# Patient Record
Sex: FEMALE | Race: BLACK OR AFRICAN AMERICAN | NOT HISPANIC OR LATINO | Employment: FULL TIME | URBAN - METROPOLITAN AREA
[De-identification: names, ages, dates, MRNs, and addresses within clinical notes are randomized per-mention and may not be internally consistent; named-entity substitution may affect disease eponyms.]

---

## 2021-08-27 ENCOUNTER — OFFICE VISIT (OUTPATIENT)
Dept: PRIMARY CARE CLINIC | Facility: CLINIC | Age: 26
End: 2021-08-27
Payer: COMMERCIAL

## 2021-08-27 ENCOUNTER — LAB VISIT (OUTPATIENT)
Dept: PRIMARY CARE CLINIC | Facility: CLINIC | Age: 26
End: 2021-08-27
Payer: COMMERCIAL

## 2021-08-27 VITALS
DIASTOLIC BLOOD PRESSURE: 84 MMHG | HEART RATE: 61 BPM | WEIGHT: 135.38 LBS | OXYGEN SATURATION: 98 % | SYSTOLIC BLOOD PRESSURE: 122 MMHG

## 2021-08-27 DIAGNOSIS — Z00.00 ANNUAL PHYSICAL EXAM: Primary | ICD-10-CM

## 2021-08-27 DIAGNOSIS — R11.2 NON-INTRACTABLE VOMITING WITH NAUSEA, UNSPECIFIED VOMITING TYPE: ICD-10-CM

## 2021-08-27 DIAGNOSIS — Z12.4 CERVICAL CANCER SCREENING: ICD-10-CM

## 2021-08-27 DIAGNOSIS — R61 HYPERHIDROSIS: ICD-10-CM

## 2021-08-27 DIAGNOSIS — Z00.00 ANNUAL PHYSICAL EXAM: ICD-10-CM

## 2021-08-27 DIAGNOSIS — F12.90 MARIJUANA USE: ICD-10-CM

## 2021-08-27 DIAGNOSIS — Z11.3 SCREEN FOR STD (SEXUALLY TRANSMITTED DISEASE): ICD-10-CM

## 2021-08-27 PROCEDURE — 1159F PR MEDICATION LIST DOCUMENTED IN MEDICAL RECORD: ICD-10-PCS | Mod: CPTII,S$GLB,, | Performed by: FAMILY MEDICINE

## 2021-08-27 PROCEDURE — 1159F MED LIST DOCD IN RCRD: CPT | Mod: CPTII,S$GLB,, | Performed by: FAMILY MEDICINE

## 2021-08-27 PROCEDURE — 87491 CHLMYD TRACH DNA AMP PROBE: CPT | Performed by: FAMILY MEDICINE

## 2021-08-27 PROCEDURE — 1126F PR PAIN SEVERITY QUANTIFIED, NO PAIN PRESENT: ICD-10-PCS | Mod: CPTII,S$GLB,, | Performed by: FAMILY MEDICINE

## 2021-08-27 PROCEDURE — 3074F SYST BP LT 130 MM HG: CPT | Mod: CPTII,S$GLB,, | Performed by: FAMILY MEDICINE

## 2021-08-27 PROCEDURE — 1160F RVW MEDS BY RX/DR IN RCRD: CPT | Mod: CPTII,S$GLB,, | Performed by: FAMILY MEDICINE

## 2021-08-27 PROCEDURE — 83036 HEMOGLOBIN GLYCOSYLATED A1C: CPT | Performed by: FAMILY MEDICINE

## 2021-08-27 PROCEDURE — 3074F PR MOST RECENT SYSTOLIC BLOOD PRESSURE < 130 MM HG: ICD-10-PCS | Mod: CPTII,S$GLB,, | Performed by: FAMILY MEDICINE

## 2021-08-27 PROCEDURE — 1126F AMNT PAIN NOTED NONE PRSNT: CPT | Mod: CPTII,S$GLB,, | Performed by: FAMILY MEDICINE

## 2021-08-27 PROCEDURE — 36415 COLL VENOUS BLD VENIPUNCTURE: CPT | Performed by: FAMILY MEDICINE

## 2021-08-27 PROCEDURE — 3079F DIAST BP 80-89 MM HG: CPT | Mod: CPTII,S$GLB,, | Performed by: FAMILY MEDICINE

## 2021-08-27 PROCEDURE — 88175 CYTOPATH C/V AUTO FLUID REDO: CPT | Performed by: FAMILY MEDICINE

## 2021-08-27 PROCEDURE — 87389 HIV-1 AG W/HIV-1&-2 AB AG IA: CPT | Performed by: FAMILY MEDICINE

## 2021-08-27 PROCEDURE — 87624 HPV HI-RISK TYP POOLED RSLT: CPT | Performed by: FAMILY MEDICINE

## 2021-08-27 PROCEDURE — 87591 N.GONORRHOEAE DNA AMP PROB: CPT | Performed by: FAMILY MEDICINE

## 2021-08-27 PROCEDURE — 80061 LIPID PANEL: CPT | Performed by: FAMILY MEDICINE

## 2021-08-27 PROCEDURE — 1160F PR REVIEW ALL MEDS BY PRESCRIBER/CLIN PHARMACIST DOCUMENTED: ICD-10-PCS | Mod: CPTII,S$GLB,, | Performed by: FAMILY MEDICINE

## 2021-08-27 PROCEDURE — 99999 PR PBB SHADOW E&M-NEW PATIENT-LVL III: ICD-10-PCS | Mod: PBBFAC,,, | Performed by: FAMILY MEDICINE

## 2021-08-27 PROCEDURE — 85025 COMPLETE CBC W/AUTO DIFF WBC: CPT | Performed by: FAMILY MEDICINE

## 2021-08-27 PROCEDURE — 86803 HEPATITIS C AB TEST: CPT | Performed by: FAMILY MEDICINE

## 2021-08-27 PROCEDURE — 36415 COLL VENOUS BLD VENIPUNCTURE: CPT | Mod: S$GLB,,, | Performed by: FAMILY MEDICINE

## 2021-08-27 PROCEDURE — 3079F PR MOST RECENT DIASTOLIC BLOOD PRESSURE 80-89 MM HG: ICD-10-PCS | Mod: CPTII,S$GLB,, | Performed by: FAMILY MEDICINE

## 2021-08-27 PROCEDURE — 99999 PR PBB SHADOW E&M-NEW PATIENT-LVL III: CPT | Mod: PBBFAC,,, | Performed by: FAMILY MEDICINE

## 2021-08-27 PROCEDURE — 80053 COMPREHEN METABOLIC PANEL: CPT | Performed by: FAMILY MEDICINE

## 2021-08-27 PROCEDURE — 36415 PR COLLECTION VENOUS BLOOD,VENIPUNCTURE: ICD-10-PCS | Mod: S$GLB,,, | Performed by: FAMILY MEDICINE

## 2021-08-27 PROCEDURE — 86592 SYPHILIS TEST NON-TREP QUAL: CPT | Performed by: FAMILY MEDICINE

## 2021-08-27 PROCEDURE — 99204 PR OFFICE/OUTPT VISIT, NEW, LEVL IV, 45-59 MIN: ICD-10-PCS | Mod: S$GLB,,, | Performed by: FAMILY MEDICINE

## 2021-08-27 PROCEDURE — 99204 OFFICE O/P NEW MOD 45 MIN: CPT | Mod: S$GLB,,, | Performed by: FAMILY MEDICINE

## 2021-08-27 RX ORDER — ONDANSETRON 4 MG/1
4 TABLET, ORALLY DISINTEGRATING ORAL EVERY 6 HOURS PRN
Qty: 30 TABLET | Refills: 1 | Status: SHIPPED | OUTPATIENT
Start: 2021-08-27 | End: 2023-03-03

## 2021-08-28 LAB
ALBUMIN SERPL BCP-MCNC: 4.1 G/DL (ref 3.5–5.2)
ALP SERPL-CCNC: 57 U/L (ref 55–135)
ALT SERPL W/O P-5'-P-CCNC: 9 U/L (ref 10–44)
ANION GAP SERPL CALC-SCNC: 7 MMOL/L (ref 8–16)
AST SERPL-CCNC: 19 U/L (ref 10–40)
BASOPHILS # BLD AUTO: 0.04 K/UL (ref 0–0.2)
BASOPHILS NFR BLD: 0.8 % (ref 0–1.9)
BILIRUB SERPL-MCNC: 0.4 MG/DL (ref 0.1–1)
BUN SERPL-MCNC: 17 MG/DL (ref 6–20)
CALCIUM SERPL-MCNC: 9.6 MG/DL (ref 8.7–10.5)
CHLORIDE SERPL-SCNC: 109 MMOL/L (ref 95–110)
CHOLEST SERPL-MCNC: 186 MG/DL (ref 120–199)
CHOLEST/HDLC SERPL: 3.4 {RATIO} (ref 2–5)
CO2 SERPL-SCNC: 25 MMOL/L (ref 23–29)
CREAT SERPL-MCNC: 1.1 MG/DL (ref 0.5–1.4)
DIFFERENTIAL METHOD: ABNORMAL
EOSINOPHIL # BLD AUTO: 0.3 K/UL (ref 0–0.5)
EOSINOPHIL NFR BLD: 5.7 % (ref 0–8)
ERYTHROCYTE [DISTWIDTH] IN BLOOD BY AUTOMATED COUNT: 13.4 % (ref 11.5–14.5)
EST. GFR  (AFRICAN AMERICAN): >60 ML/MIN/1.73 M^2
EST. GFR  (NON AFRICAN AMERICAN): >60 ML/MIN/1.73 M^2
ESTIMATED AVG GLUCOSE: 97 MG/DL (ref 68–131)
GLUCOSE SERPL-MCNC: 84 MG/DL (ref 70–110)
HBA1C MFR BLD: 5 % (ref 4–5.6)
HCT VFR BLD AUTO: 36.4 % (ref 37–48.5)
HDLC SERPL-MCNC: 55 MG/DL (ref 40–75)
HDLC SERPL: 29.6 % (ref 20–50)
HGB BLD-MCNC: 12.3 G/DL (ref 12–16)
IMM GRANULOCYTES # BLD AUTO: 0 K/UL (ref 0–0.04)
IMM GRANULOCYTES NFR BLD AUTO: 0 % (ref 0–0.5)
LDLC SERPL CALC-MCNC: 116 MG/DL (ref 63–159)
LYMPHOCYTES # BLD AUTO: 2.4 K/UL (ref 1–4.8)
LYMPHOCYTES NFR BLD: 46.5 % (ref 18–48)
MCH RBC QN AUTO: 31.3 PG (ref 27–31)
MCHC RBC AUTO-ENTMCNC: 33.8 G/DL (ref 32–36)
MCV RBC AUTO: 93 FL (ref 82–98)
MONOCYTES # BLD AUTO: 0.5 K/UL (ref 0.3–1)
MONOCYTES NFR BLD: 9.7 % (ref 4–15)
NEUTROPHILS # BLD AUTO: 1.9 K/UL (ref 1.8–7.7)
NEUTROPHILS NFR BLD: 37.3 % (ref 38–73)
NONHDLC SERPL-MCNC: 131 MG/DL
NRBC BLD-RTO: 0 /100 WBC
PLATELET # BLD AUTO: 248 K/UL (ref 150–450)
PMV BLD AUTO: 11 FL (ref 9.2–12.9)
POTASSIUM SERPL-SCNC: 4 MMOL/L (ref 3.5–5.1)
PROT SERPL-MCNC: 7.3 G/DL (ref 6–8.4)
RBC # BLD AUTO: 3.93 M/UL (ref 4–5.4)
RPR SER QL: NORMAL
SODIUM SERPL-SCNC: 141 MMOL/L (ref 136–145)
TRIGL SERPL-MCNC: 75 MG/DL (ref 30–150)
WBC # BLD AUTO: 5.07 K/UL (ref 3.9–12.7)

## 2021-08-30 LAB
C TRACH DNA SPEC QL NAA+PROBE: DETECTED
N GONORRHOEA DNA SPEC QL NAA+PROBE: NOT DETECTED

## 2021-08-31 LAB
HCV AB SERPL QL IA: NEGATIVE
HIV 1+2 AB+HIV1 P24 AG SERPL QL IA: NEGATIVE

## 2021-09-07 RX ORDER — AZITHROMYCIN 500 MG/1
1000 TABLET, FILM COATED ORAL ONCE
Qty: 2 TABLET | Refills: 0 | Status: SHIPPED | OUTPATIENT
Start: 2021-09-07 | End: 2021-09-07

## 2021-09-15 LAB
CLINICAL INFO: ABNORMAL
CYTO CVX: ABNORMAL
CYTOLOGIST CVX/VAG CYTO: ABNORMAL
CYTOLOGIST CVX/VAG CYTO: ABNORMAL
CYTOLOGY CMNT CVX/VAG CYTO-IMP: ABNORMAL
CYTOLOGY PAP THIN PREP EXPLANATION: ABNORMAL
DATE OF PREVIOUS PAP: ABNORMAL
DATE PREVIOUS BX: NO
GEN CATEG CVX/VAG CYTO-IMP: ABNORMAL
HPV I/H RISK 4 DNA CVX QL NAA+PROBE: NOT DETECTED
LMP START DATE: ABNORMAL
MICROORGANISM CVX/VAG CYTO: ABNORMAL
PATHOLOGIST CVX/VAG CYTO: ABNORMAL
SERVICE CMNT-IMP: ABNORMAL
SPECIMEN SOURCE CVX/VAG CYTO: ABNORMAL
STAT OF ADQ CVX/VAG CYTO-IMP: ABNORMAL

## 2021-11-05 ENCOUNTER — OFFICE VISIT (OUTPATIENT)
Dept: PRIMARY CARE CLINIC | Facility: CLINIC | Age: 26
End: 2021-11-05
Payer: COMMERCIAL

## 2021-11-05 VITALS
HEART RATE: 65 BPM | BODY MASS INDEX: 27.02 KG/M2 | SYSTOLIC BLOOD PRESSURE: 148 MMHG | WEIGHT: 146.81 LBS | DIASTOLIC BLOOD PRESSURE: 94 MMHG | OXYGEN SATURATION: 98 % | HEIGHT: 62 IN | TEMPERATURE: 98 F

## 2021-11-05 DIAGNOSIS — Z86.19 HISTORY OF CHLAMYDIA: Primary | ICD-10-CM

## 2021-11-05 DIAGNOSIS — L60.0 INGROWN TOENAIL OF BOTH FEET: ICD-10-CM

## 2021-11-05 PROCEDURE — 87491 CHLMYD TRACH DNA AMP PROBE: CPT | Performed by: NURSE PRACTITIONER

## 2021-11-05 PROCEDURE — 87591 N.GONORRHOEAE DNA AMP PROB: CPT | Performed by: NURSE PRACTITIONER

## 2021-11-05 PROCEDURE — 3044F HG A1C LEVEL LT 7.0%: CPT | Mod: CPTII,S$GLB,, | Performed by: NURSE PRACTITIONER

## 2021-11-05 PROCEDURE — 99999 PR PBB SHADOW E&M-EST. PATIENT-LVL III: ICD-10-PCS | Mod: PBBFAC,,, | Performed by: NURSE PRACTITIONER

## 2021-11-05 PROCEDURE — 3008F BODY MASS INDEX DOCD: CPT | Mod: CPTII,S$GLB,, | Performed by: NURSE PRACTITIONER

## 2021-11-05 PROCEDURE — 3077F PR MOST RECENT SYSTOLIC BLOOD PRESSURE >= 140 MM HG: ICD-10-PCS | Mod: CPTII,S$GLB,, | Performed by: NURSE PRACTITIONER

## 2021-11-05 PROCEDURE — 3044F PR MOST RECENT HEMOGLOBIN A1C LEVEL <7.0%: ICD-10-PCS | Mod: CPTII,S$GLB,, | Performed by: NURSE PRACTITIONER

## 2021-11-05 PROCEDURE — 3008F PR BODY MASS INDEX (BMI) DOCUMENTED: ICD-10-PCS | Mod: CPTII,S$GLB,, | Performed by: NURSE PRACTITIONER

## 2021-11-05 PROCEDURE — 99212 PR OFFICE/OUTPT VISIT, EST, LEVL II, 10-19 MIN: ICD-10-PCS | Mod: S$GLB,,, | Performed by: NURSE PRACTITIONER

## 2021-11-05 PROCEDURE — 99212 OFFICE O/P EST SF 10 MIN: CPT | Mod: S$GLB,,, | Performed by: NURSE PRACTITIONER

## 2021-11-05 PROCEDURE — 1159F MED LIST DOCD IN RCRD: CPT | Mod: CPTII,S$GLB,, | Performed by: NURSE PRACTITIONER

## 2021-11-05 PROCEDURE — 1159F PR MEDICATION LIST DOCUMENTED IN MEDICAL RECORD: ICD-10-PCS | Mod: CPTII,S$GLB,, | Performed by: NURSE PRACTITIONER

## 2021-11-05 PROCEDURE — 3077F SYST BP >= 140 MM HG: CPT | Mod: CPTII,S$GLB,, | Performed by: NURSE PRACTITIONER

## 2021-11-05 PROCEDURE — 99999 PR PBB SHADOW E&M-EST. PATIENT-LVL III: CPT | Mod: PBBFAC,,, | Performed by: NURSE PRACTITIONER

## 2021-11-05 PROCEDURE — 3080F DIAST BP >= 90 MM HG: CPT | Mod: CPTII,S$GLB,, | Performed by: NURSE PRACTITIONER

## 2021-11-05 PROCEDURE — 3080F PR MOST RECENT DIASTOLIC BLOOD PRESSURE >= 90 MM HG: ICD-10-PCS | Mod: CPTII,S$GLB,, | Performed by: NURSE PRACTITIONER

## 2021-11-10 ENCOUNTER — OFFICE VISIT (OUTPATIENT)
Dept: PODIATRY | Facility: CLINIC | Age: 26
End: 2021-11-10
Payer: COMMERCIAL

## 2021-11-10 ENCOUNTER — TELEPHONE (OUTPATIENT)
Dept: PRIMARY CARE CLINIC | Facility: CLINIC | Age: 26
End: 2021-11-10
Payer: COMMERCIAL

## 2021-11-10 VITALS
BODY MASS INDEX: 26.94 KG/M2 | RESPIRATION RATE: 18 BRPM | HEIGHT: 62 IN | HEART RATE: 65 BPM | SYSTOLIC BLOOD PRESSURE: 145 MMHG | WEIGHT: 146.38 LBS | DIASTOLIC BLOOD PRESSURE: 95 MMHG

## 2021-11-10 DIAGNOSIS — L84 CORN OR CALLUS: Primary | ICD-10-CM

## 2021-11-10 DIAGNOSIS — M21.41 PES PLANUS OF BOTH FEET: ICD-10-CM

## 2021-11-10 DIAGNOSIS — L60.0 INGROWN TOENAIL OF BOTH FEET: ICD-10-CM

## 2021-11-10 DIAGNOSIS — M21.42 PES PLANUS OF BOTH FEET: ICD-10-CM

## 2021-11-10 PROCEDURE — 99999 PR PBB SHADOW E&M-EST. PATIENT-LVL III: CPT | Mod: PBBFAC,,, | Performed by: PODIATRIST

## 2021-11-10 PROCEDURE — 3080F DIAST BP >= 90 MM HG: CPT | Mod: CPTII,S$GLB,, | Performed by: PODIATRIST

## 2021-11-10 PROCEDURE — 3044F HG A1C LEVEL LT 7.0%: CPT | Mod: CPTII,S$GLB,, | Performed by: PODIATRIST

## 2021-11-10 PROCEDURE — 99999 PR PBB SHADOW E&M-EST. PATIENT-LVL III: ICD-10-PCS | Mod: PBBFAC,,, | Performed by: PODIATRIST

## 2021-11-10 PROCEDURE — 3077F SYST BP >= 140 MM HG: CPT | Mod: CPTII,S$GLB,, | Performed by: PODIATRIST

## 2021-11-10 PROCEDURE — 1159F MED LIST DOCD IN RCRD: CPT | Mod: CPTII,S$GLB,, | Performed by: PODIATRIST

## 2021-11-10 PROCEDURE — 99203 OFFICE O/P NEW LOW 30 MIN: CPT | Mod: S$GLB,,, | Performed by: PODIATRIST

## 2021-11-10 PROCEDURE — 99203 PR OFFICE/OUTPT VISIT, NEW, LEVL III, 30-44 MIN: ICD-10-PCS | Mod: S$GLB,,, | Performed by: PODIATRIST

## 2021-11-10 PROCEDURE — 3008F PR BODY MASS INDEX (BMI) DOCUMENTED: ICD-10-PCS | Mod: CPTII,S$GLB,, | Performed by: PODIATRIST

## 2021-11-10 PROCEDURE — 3044F PR MOST RECENT HEMOGLOBIN A1C LEVEL <7.0%: ICD-10-PCS | Mod: CPTII,S$GLB,, | Performed by: PODIATRIST

## 2021-11-10 PROCEDURE — 3008F BODY MASS INDEX DOCD: CPT | Mod: CPTII,S$GLB,, | Performed by: PODIATRIST

## 2021-11-10 PROCEDURE — 3077F PR MOST RECENT SYSTOLIC BLOOD PRESSURE >= 140 MM HG: ICD-10-PCS | Mod: CPTII,S$GLB,, | Performed by: PODIATRIST

## 2021-11-10 PROCEDURE — 1159F PR MEDICATION LIST DOCUMENTED IN MEDICAL RECORD: ICD-10-PCS | Mod: CPTII,S$GLB,, | Performed by: PODIATRIST

## 2021-11-10 PROCEDURE — 3080F PR MOST RECENT DIASTOLIC BLOOD PRESSURE >= 90 MM HG: ICD-10-PCS | Mod: CPTII,S$GLB,, | Performed by: PODIATRIST

## 2021-11-15 ENCOUNTER — CLINICAL SUPPORT (OUTPATIENT)
Dept: PRIMARY CARE CLINIC | Facility: CLINIC | Age: 26
End: 2021-11-15
Payer: COMMERCIAL

## 2021-11-15 DIAGNOSIS — Z86.19 HISTORY OF CHLAMYDIA: Primary | ICD-10-CM

## 2021-11-15 DIAGNOSIS — Z11.3 SCREEN FOR STD (SEXUALLY TRANSMITTED DISEASE): ICD-10-CM

## 2021-11-15 PROCEDURE — 87491 CHLMYD TRACH DNA AMP PROBE: CPT | Performed by: NURSE PRACTITIONER

## 2021-11-15 PROCEDURE — 87591 N.GONORRHOEAE DNA AMP PROB: CPT | Performed by: NURSE PRACTITIONER

## 2021-11-19 LAB
C TRACH DNA SPEC QL NAA+PROBE: NOT DETECTED
N GONORRHOEA DNA SPEC QL NAA+PROBE: NOT DETECTED

## 2023-01-11 ENCOUNTER — PATIENT MESSAGE (OUTPATIENT)
Dept: ADMINISTRATIVE | Facility: HOSPITAL | Age: 28
End: 2023-01-11
Payer: COMMERCIAL

## 2023-01-25 ENCOUNTER — LAB VISIT (OUTPATIENT)
Dept: PRIMARY CARE CLINIC | Facility: CLINIC | Age: 28
End: 2023-01-25
Payer: COMMERCIAL

## 2023-01-25 ENCOUNTER — OFFICE VISIT (OUTPATIENT)
Dept: PRIMARY CARE CLINIC | Facility: CLINIC | Age: 28
End: 2023-01-25
Payer: COMMERCIAL

## 2023-01-25 VITALS
SYSTOLIC BLOOD PRESSURE: 173 MMHG | HEIGHT: 62 IN | WEIGHT: 155.31 LBS | BODY MASS INDEX: 28.58 KG/M2 | DIASTOLIC BLOOD PRESSURE: 98 MMHG | OXYGEN SATURATION: 99 % | HEART RATE: 81 BPM

## 2023-01-25 DIAGNOSIS — I10 HYPERTENSION, UNSPECIFIED TYPE: ICD-10-CM

## 2023-01-25 DIAGNOSIS — Z11.3 SCREENING EXAMINATION FOR STD (SEXUALLY TRANSMITTED DISEASE): Primary | ICD-10-CM

## 2023-01-25 LAB — TSH SERPL DL<=0.005 MIU/L-ACNC: 0.42 UIU/ML (ref 0.4–4)

## 2023-01-25 PROCEDURE — 84443 ASSAY THYROID STIM HORMONE: CPT | Performed by: STUDENT IN AN ORGANIZED HEALTH CARE EDUCATION/TRAINING PROGRAM

## 2023-01-25 PROCEDURE — 99999 PR PBB SHADOW E&M-EST. PATIENT-LVL III: ICD-10-PCS | Mod: PBBFAC,,, | Performed by: STUDENT IN AN ORGANIZED HEALTH CARE EDUCATION/TRAINING PROGRAM

## 2023-01-25 PROCEDURE — 3008F BODY MASS INDEX DOCD: CPT | Mod: CPTII,S$GLB,, | Performed by: STUDENT IN AN ORGANIZED HEALTH CARE EDUCATION/TRAINING PROGRAM

## 2023-01-25 PROCEDURE — 1159F MED LIST DOCD IN RCRD: CPT | Mod: CPTII,S$GLB,, | Performed by: STUDENT IN AN ORGANIZED HEALTH CARE EDUCATION/TRAINING PROGRAM

## 2023-01-25 PROCEDURE — 99999 PR PBB SHADOW E&M-EST. PATIENT-LVL III: CPT | Mod: PBBFAC,,, | Performed by: STUDENT IN AN ORGANIZED HEALTH CARE EDUCATION/TRAINING PROGRAM

## 2023-01-25 PROCEDURE — 3080F DIAST BP >= 90 MM HG: CPT | Mod: CPTII,S$GLB,, | Performed by: STUDENT IN AN ORGANIZED HEALTH CARE EDUCATION/TRAINING PROGRAM

## 2023-01-25 PROCEDURE — 87591 N.GONORRHOEAE DNA AMP PROB: CPT | Performed by: STUDENT IN AN ORGANIZED HEALTH CARE EDUCATION/TRAINING PROGRAM

## 2023-01-25 PROCEDURE — 1159F PR MEDICATION LIST DOCUMENTED IN MEDICAL RECORD: ICD-10-PCS | Mod: CPTII,S$GLB,, | Performed by: STUDENT IN AN ORGANIZED HEALTH CARE EDUCATION/TRAINING PROGRAM

## 2023-01-25 PROCEDURE — 87491 CHLMYD TRACH DNA AMP PROBE: CPT | Performed by: STUDENT IN AN ORGANIZED HEALTH CARE EDUCATION/TRAINING PROGRAM

## 2023-01-25 PROCEDURE — 3080F PR MOST RECENT DIASTOLIC BLOOD PRESSURE >= 90 MM HG: ICD-10-PCS | Mod: CPTII,S$GLB,, | Performed by: STUDENT IN AN ORGANIZED HEALTH CARE EDUCATION/TRAINING PROGRAM

## 2023-01-25 PROCEDURE — 3077F PR MOST RECENT SYSTOLIC BLOOD PRESSURE >= 140 MM HG: ICD-10-PCS | Mod: CPTII,S$GLB,, | Performed by: STUDENT IN AN ORGANIZED HEALTH CARE EDUCATION/TRAINING PROGRAM

## 2023-01-25 PROCEDURE — 3008F PR BODY MASS INDEX (BMI) DOCUMENTED: ICD-10-PCS | Mod: CPTII,S$GLB,, | Performed by: STUDENT IN AN ORGANIZED HEALTH CARE EDUCATION/TRAINING PROGRAM

## 2023-01-25 PROCEDURE — 99214 PR OFFICE/OUTPT VISIT, EST, LEVL IV, 30-39 MIN: ICD-10-PCS | Mod: S$GLB,,, | Performed by: STUDENT IN AN ORGANIZED HEALTH CARE EDUCATION/TRAINING PROGRAM

## 2023-01-25 PROCEDURE — 3077F SYST BP >= 140 MM HG: CPT | Mod: CPTII,S$GLB,, | Performed by: STUDENT IN AN ORGANIZED HEALTH CARE EDUCATION/TRAINING PROGRAM

## 2023-01-25 PROCEDURE — 99214 OFFICE O/P EST MOD 30 MIN: CPT | Mod: S$GLB,,, | Performed by: STUDENT IN AN ORGANIZED HEALTH CARE EDUCATION/TRAINING PROGRAM

## 2023-01-25 NOTE — PROGRESS NOTES
01/25/2023    Alexis Milligan  [id number]    Chief Complaint   Patient presents with    Annual Exam       HPI    This pt is new to me and presents for routine care. She has not has a check up in awhile. Sine her last check up she has been seen in the ED for back pain and dx with thoracolumbar levoscoliosis; she has never been told this before. Endorses 6-7 hr of sleep per night. Describes diet as not terrible and has been working on it. Tries to eat veggies and lean meats and fish. Tries to use non salt seasoning options. Does exercise routinely and has a ; exercising at least 4 times per week. Does not monitor BP routinely; would like to avoid medication.      Negative 10 point ROS outside of HPI    Social History     Socioeconomic History    Marital status: Single   Tobacco Use    Smoking status: Never    Smokeless tobacco: Never           Current Outpatient Medications:     ondansetron (ZOFRAN-ODT) 4 MG TbDL, Take 1 tablet (4 mg total) by mouth every 6 (six) hours as needed (nausea/vomiting)., Disp: 30 tablet, Rfl: 1      Physical Exam  Vitals:    01/25/23 0937   BP: (!) 173/98   Pulse: 81       GEN: NAD, AAox3, well nourished  HEENT: NCAT, EOMI, PEERL, no scleral injection, TM normal, moist mucous membranes, oropharynx clear, no erythema, no exudates  NECK: full rom, no cervical lymphadenopathy, no thyroidmegally  CV: RRR, no m/r/g, trace LE edema  LUNGS: CTAB, non-labored breathing, no wheezes, no crackles  ABD: soft, non-distended, no rebound/guarding, no organomegaly  EXT: n c/c/e, warm, 5/5 UE and LE strength  NEURO: CNII-XII intact no focal deficit  PSYCH: nl affect, no hallucinations, nl speech  Skin: intact, no rashes/lesions/erythema    1. Screening examination for STD (sexually transmitted disease)  - C. trachomatis/N. gonorrhoeae by AMP DNA [facility]; Urine    2. Hypertension, unspecified type  - TSH; Future  Other labs reviewed and wnl  Discussed lifestyle management at length  Rtc in 2 weeks for bp  recheck  May need to start med such as nifedipine      Windy Ordoñez MD  Family Medicine

## 2023-01-26 ENCOUNTER — PATIENT OUTREACH (OUTPATIENT)
Dept: ADMINISTRATIVE | Facility: HOSPITAL | Age: 28
End: 2023-01-26
Payer: COMMERCIAL

## 2023-01-26 ENCOUNTER — PATIENT MESSAGE (OUTPATIENT)
Dept: ADMINISTRATIVE | Facility: HOSPITAL | Age: 28
End: 2023-01-26
Payer: COMMERCIAL

## 2023-01-27 LAB
C TRACH DNA SPEC QL NAA+PROBE: NOT DETECTED
N GONORRHOEA DNA SPEC QL NAA+PROBE: NOT DETECTED

## 2023-02-27 ENCOUNTER — OFFICE VISIT (OUTPATIENT)
Dept: PRIMARY CARE CLINIC | Facility: CLINIC | Age: 28
End: 2023-02-27
Payer: COMMERCIAL

## 2023-02-27 VITALS
OXYGEN SATURATION: 100 % | SYSTOLIC BLOOD PRESSURE: 177 MMHG | HEART RATE: 91 BPM | WEIGHT: 155.56 LBS | BODY MASS INDEX: 28.63 KG/M2 | HEIGHT: 62 IN | DIASTOLIC BLOOD PRESSURE: 94 MMHG

## 2023-02-27 DIAGNOSIS — G89.29 CHRONIC RIGHT SHOULDER PAIN: Primary | ICD-10-CM

## 2023-02-27 DIAGNOSIS — Z01.419 WELL WOMAN EXAM: ICD-10-CM

## 2023-02-27 DIAGNOSIS — M25.511 CHRONIC RIGHT SHOULDER PAIN: Primary | ICD-10-CM

## 2023-02-27 DIAGNOSIS — I10 HYPERTENSION, UNSPECIFIED TYPE: ICD-10-CM

## 2023-02-27 PROCEDURE — 3080F DIAST BP >= 90 MM HG: CPT | Mod: CPTII,S$GLB,, | Performed by: STUDENT IN AN ORGANIZED HEALTH CARE EDUCATION/TRAINING PROGRAM

## 2023-02-27 PROCEDURE — 3077F PR MOST RECENT SYSTOLIC BLOOD PRESSURE >= 140 MM HG: ICD-10-PCS | Mod: CPTII,S$GLB,, | Performed by: STUDENT IN AN ORGANIZED HEALTH CARE EDUCATION/TRAINING PROGRAM

## 2023-02-27 PROCEDURE — 99999 PR PBB SHADOW E&M-EST. PATIENT-LVL III: ICD-10-PCS | Mod: PBBFAC,,, | Performed by: STUDENT IN AN ORGANIZED HEALTH CARE EDUCATION/TRAINING PROGRAM

## 2023-02-27 PROCEDURE — 3077F SYST BP >= 140 MM HG: CPT | Mod: CPTII,S$GLB,, | Performed by: STUDENT IN AN ORGANIZED HEALTH CARE EDUCATION/TRAINING PROGRAM

## 2023-02-27 PROCEDURE — 99999 PR PBB SHADOW E&M-EST. PATIENT-LVL III: CPT | Mod: PBBFAC,,, | Performed by: STUDENT IN AN ORGANIZED HEALTH CARE EDUCATION/TRAINING PROGRAM

## 2023-02-27 PROCEDURE — 1159F PR MEDICATION LIST DOCUMENTED IN MEDICAL RECORD: ICD-10-PCS | Mod: CPTII,S$GLB,, | Performed by: STUDENT IN AN ORGANIZED HEALTH CARE EDUCATION/TRAINING PROGRAM

## 2023-02-27 PROCEDURE — 3008F BODY MASS INDEX DOCD: CPT | Mod: CPTII,S$GLB,, | Performed by: STUDENT IN AN ORGANIZED HEALTH CARE EDUCATION/TRAINING PROGRAM

## 2023-02-27 PROCEDURE — 99214 OFFICE O/P EST MOD 30 MIN: CPT | Mod: S$GLB,,, | Performed by: STUDENT IN AN ORGANIZED HEALTH CARE EDUCATION/TRAINING PROGRAM

## 2023-02-27 PROCEDURE — 3008F PR BODY MASS INDEX (BMI) DOCUMENTED: ICD-10-PCS | Mod: CPTII,S$GLB,, | Performed by: STUDENT IN AN ORGANIZED HEALTH CARE EDUCATION/TRAINING PROGRAM

## 2023-02-27 PROCEDURE — 99214 PR OFFICE/OUTPT VISIT, EST, LEVL IV, 30-39 MIN: ICD-10-PCS | Mod: S$GLB,,, | Performed by: STUDENT IN AN ORGANIZED HEALTH CARE EDUCATION/TRAINING PROGRAM

## 2023-02-27 PROCEDURE — 3080F PR MOST RECENT DIASTOLIC BLOOD PRESSURE >= 90 MM HG: ICD-10-PCS | Mod: CPTII,S$GLB,, | Performed by: STUDENT IN AN ORGANIZED HEALTH CARE EDUCATION/TRAINING PROGRAM

## 2023-02-27 PROCEDURE — 1159F MED LIST DOCD IN RCRD: CPT | Mod: CPTII,S$GLB,, | Performed by: STUDENT IN AN ORGANIZED HEALTH CARE EDUCATION/TRAINING PROGRAM

## 2023-02-27 NOTE — PROGRESS NOTES
02/27/2023    Alexis Milligan  [id number]    CC: HTN    HPI    This     HTN  Trying to manage with lifestyle  Working out 4 days per week.   Diet: is focusing on carbs and protein; does not think that she is getting enough greens    Shoulder, right, chronic  Thought that she strained it several months ago but progressively hurting in the last 1-2 months even at rest  Describes as a pinch and is affecting her ADL's   No OTC meds, has tried icing  Hx of minor injuries 24/ playing lots of sport's hx of dislocation  Will feel like it's flaring      Negative 10 point ROS outside of HPI    Social History     Socioeconomic History    Marital status: Single   Tobacco Use    Smoking status: Never    Smokeless tobacco: Never           Current Outpatient Medications:     ondansetron (ZOFRAN-ODT) 4 MG TbDL, Take 1 tablet (4 mg total) by mouth every 6 (six) hours as needed (nausea/vomiting). (Patient not taking: Reported on 2/27/2023), Disp: 30 tablet, Rfl: 1      Physical Exam  Vitals:    02/27/23 0923   BP: (!) 177/94   Pulse: 91     Gen: well appearing, NAD  Resp: non labored breathing, no crackles, no wheezes, CTAB  CV: RRR no murmur, gallops, rubs, no LE edema  Shoulder:    Inspection:no obvious deformity or asymmetry   Palpation: non tender AC joint, + bicipital groove tenderness   ROM: Full ROM active and passive   Rotator cuff: + drop arm test, + empty can, + Neer +Hawkains R       1. Chronic right shoulder pain  - Ambulatory referral/consult to Physical/Occupational Therapy; Future  -continue icing, modify/stop movements that hurt such as push presses  Unable to take NSAIDS 2/2 HTN    2. Hypertension, unspecified type  RTC this week with home cuff  Pt hesistant to start meds  Hx of following with nephrology    3. Well woman exam  - Ambulatory referral/consult to Gynecology; Future  -Overdue for pap      Windy Ordoñez MD  Family Medicine

## 2023-03-03 ENCOUNTER — LAB VISIT (OUTPATIENT)
Dept: LAB | Facility: HOSPITAL | Age: 28
End: 2023-03-03
Attending: STUDENT IN AN ORGANIZED HEALTH CARE EDUCATION/TRAINING PROGRAM
Payer: COMMERCIAL

## 2023-03-03 ENCOUNTER — OFFICE VISIT (OUTPATIENT)
Dept: OBSTETRICS AND GYNECOLOGY | Facility: CLINIC | Age: 28
End: 2023-03-03
Payer: COMMERCIAL

## 2023-03-03 ENCOUNTER — CLINICAL SUPPORT (OUTPATIENT)
Dept: PRIMARY CARE CLINIC | Facility: CLINIC | Age: 28
End: 2023-03-03
Payer: COMMERCIAL

## 2023-03-03 VITALS
WEIGHT: 153.25 LBS | RESPIRATION RATE: 18 BRPM | BODY MASS INDEX: 28.2 KG/M2 | SYSTOLIC BLOOD PRESSURE: 138 MMHG | HEIGHT: 62 IN | DIASTOLIC BLOOD PRESSURE: 78 MMHG

## 2023-03-03 VITALS — DIASTOLIC BLOOD PRESSURE: 78 MMHG | SYSTOLIC BLOOD PRESSURE: 148 MMHG

## 2023-03-03 DIAGNOSIS — I10 HYPERTENSION, UNSPECIFIED TYPE: Primary | ICD-10-CM

## 2023-03-03 DIAGNOSIS — Z20.89 CONTACT WITH AND (SUSPECTED) EXPOSURE TO OTHER COMMUNICABLE DISEASES: ICD-10-CM

## 2023-03-03 DIAGNOSIS — Z01.419 WELL WOMAN EXAM WITH ROUTINE GYNECOLOGICAL EXAM: ICD-10-CM

## 2023-03-03 DIAGNOSIS — Z01.419 WELL WOMAN EXAM: ICD-10-CM

## 2023-03-03 DIAGNOSIS — Z12.4 SCREENING FOR MALIGNANT NEOPLASM OF CERVIX: Primary | ICD-10-CM

## 2023-03-03 DIAGNOSIS — Z01.30 BLOOD PRESSURE CHECK: Primary | ICD-10-CM

## 2023-03-03 LAB
HBV SURFACE AG SERPL QL IA: NORMAL
HCV AB SERPL QL IA: NORMAL
HIV 1+2 AB+HIV1 P24 AG SERPL QL IA: NORMAL

## 2023-03-03 PROCEDURE — 3078F DIAST BP <80 MM HG: CPT | Mod: CPTII,S$GLB,, | Performed by: STUDENT IN AN ORGANIZED HEALTH CARE EDUCATION/TRAINING PROGRAM

## 2023-03-03 PROCEDURE — 99999 PR PBB SHADOW E&M-EST. PATIENT-LVL II: ICD-10-PCS | Mod: PBBFAC,,,

## 2023-03-03 PROCEDURE — 3075F SYST BP GE 130 - 139MM HG: CPT | Mod: CPTII,S$GLB,, | Performed by: STUDENT IN AN ORGANIZED HEALTH CARE EDUCATION/TRAINING PROGRAM

## 2023-03-03 PROCEDURE — 1160F RVW MEDS BY RX/DR IN RCRD: CPT | Mod: CPTII,S$GLB,, | Performed by: STUDENT IN AN ORGANIZED HEALTH CARE EDUCATION/TRAINING PROGRAM

## 2023-03-03 PROCEDURE — 87340 HEPATITIS B SURFACE AG IA: CPT | Performed by: STUDENT IN AN ORGANIZED HEALTH CARE EDUCATION/TRAINING PROGRAM

## 2023-03-03 PROCEDURE — 86592 SYPHILIS TEST NON-TREP QUAL: CPT | Performed by: STUDENT IN AN ORGANIZED HEALTH CARE EDUCATION/TRAINING PROGRAM

## 2023-03-03 PROCEDURE — 99385 PR PREVENTIVE VISIT,NEW,18-39: ICD-10-PCS | Mod: S$GLB,,, | Performed by: STUDENT IN AN ORGANIZED HEALTH CARE EDUCATION/TRAINING PROGRAM

## 2023-03-03 PROCEDURE — 3075F PR MOST RECENT SYSTOLIC BLOOD PRESS GE 130-139MM HG: ICD-10-PCS | Mod: CPTII,S$GLB,, | Performed by: STUDENT IN AN ORGANIZED HEALTH CARE EDUCATION/TRAINING PROGRAM

## 2023-03-03 PROCEDURE — 99999 PR PBB SHADOW E&M-EST. PATIENT-LVL II: CPT | Mod: PBBFAC,,,

## 2023-03-03 PROCEDURE — 87389 HIV-1 AG W/HIV-1&-2 AB AG IA: CPT | Performed by: STUDENT IN AN ORGANIZED HEALTH CARE EDUCATION/TRAINING PROGRAM

## 2023-03-03 PROCEDURE — 36415 COLL VENOUS BLD VENIPUNCTURE: CPT | Performed by: STUDENT IN AN ORGANIZED HEALTH CARE EDUCATION/TRAINING PROGRAM

## 2023-03-03 PROCEDURE — 1160F PR REVIEW ALL MEDS BY PRESCRIBER/CLIN PHARMACIST DOCUMENTED: ICD-10-PCS | Mod: CPTII,S$GLB,, | Performed by: STUDENT IN AN ORGANIZED HEALTH CARE EDUCATION/TRAINING PROGRAM

## 2023-03-03 PROCEDURE — 3008F BODY MASS INDEX DOCD: CPT | Mod: CPTII,S$GLB,, | Performed by: STUDENT IN AN ORGANIZED HEALTH CARE EDUCATION/TRAINING PROGRAM

## 2023-03-03 PROCEDURE — 88175 CYTOPATH C/V AUTO FLUID REDO: CPT | Performed by: STUDENT IN AN ORGANIZED HEALTH CARE EDUCATION/TRAINING PROGRAM

## 2023-03-03 PROCEDURE — 3008F PR BODY MASS INDEX (BMI) DOCUMENTED: ICD-10-PCS | Mod: CPTII,S$GLB,, | Performed by: STUDENT IN AN ORGANIZED HEALTH CARE EDUCATION/TRAINING PROGRAM

## 2023-03-03 PROCEDURE — 99999 PR PBB SHADOW E&M-EST. PATIENT-LVL III: ICD-10-PCS | Mod: PBBFAC,,, | Performed by: STUDENT IN AN ORGANIZED HEALTH CARE EDUCATION/TRAINING PROGRAM

## 2023-03-03 PROCEDURE — 86803 HEPATITIS C AB TEST: CPT | Performed by: STUDENT IN AN ORGANIZED HEALTH CARE EDUCATION/TRAINING PROGRAM

## 2023-03-03 PROCEDURE — 1159F PR MEDICATION LIST DOCUMENTED IN MEDICAL RECORD: ICD-10-PCS | Mod: CPTII,S$GLB,, | Performed by: STUDENT IN AN ORGANIZED HEALTH CARE EDUCATION/TRAINING PROGRAM

## 2023-03-03 PROCEDURE — 99999 PR PBB SHADOW E&M-EST. PATIENT-LVL III: CPT | Mod: PBBFAC,,, | Performed by: STUDENT IN AN ORGANIZED HEALTH CARE EDUCATION/TRAINING PROGRAM

## 2023-03-03 PROCEDURE — 81514 NFCT DS BV&VAGINITIS DNA ALG: CPT | Performed by: STUDENT IN AN ORGANIZED HEALTH CARE EDUCATION/TRAINING PROGRAM

## 2023-03-03 PROCEDURE — 87591 N.GONORRHOEAE DNA AMP PROB: CPT | Performed by: STUDENT IN AN ORGANIZED HEALTH CARE EDUCATION/TRAINING PROGRAM

## 2023-03-03 PROCEDURE — 99385 PREV VISIT NEW AGE 18-39: CPT | Mod: S$GLB,,, | Performed by: STUDENT IN AN ORGANIZED HEALTH CARE EDUCATION/TRAINING PROGRAM

## 2023-03-03 PROCEDURE — 3078F PR MOST RECENT DIASTOLIC BLOOD PRESSURE < 80 MM HG: ICD-10-PCS | Mod: CPTII,S$GLB,, | Performed by: STUDENT IN AN ORGANIZED HEALTH CARE EDUCATION/TRAINING PROGRAM

## 2023-03-03 PROCEDURE — 1159F MED LIST DOCD IN RCRD: CPT | Mod: CPTII,S$GLB,, | Performed by: STUDENT IN AN ORGANIZED HEALTH CARE EDUCATION/TRAINING PROGRAM

## 2023-03-03 RX ORDER — HYDROCHLOROTHIAZIDE 12.5 MG/1
12.5 TABLET ORAL DAILY
Qty: 30 TABLET | Refills: 1 | Status: SHIPPED | OUTPATIENT
Start: 2023-03-03 | End: 2023-05-17 | Stop reason: SDUPTHER

## 2023-03-03 NOTE — PROGRESS NOTES
Pt arrived for nurse visit manual BP. Pt brought in home machine for calibration. Pts manual BP sitting both feet on floor right arm 146/96, 148/98 left arm. Pts BP with home machine plugged into electrical socket right arm 158/107, left arm 148/78. Pt states she was trying to stay of medicine has been exercising and eating healthy. Nurse advised pt to continue those habits.

## 2023-03-03 NOTE — PROGRESS NOTES
[facility]    Subjective:   Chief Complaint:    Chief Complaint   Patient presents with    Annual Exam       Patient's last menstrual period was 2023 (exact date).  Contraception: None.  HRT: None.    Alexis Milligan is a 27 y.o.  who presents for an annual exam.  The patient has no GYN complaints today.  She does want STD screening.  She participates in regular exercise:   Works with a  3-6 times per week.  She does smoke cigars.  She wears seatbelts.  She is not taking a multivitamin, vitamin D, and calcium.  She denies any domestic violence.      Last Pap:  four years ago. Denies any history of abnormal pap smears.  Last mammogram: N/A.     FH:  Breast cancer: none.  Colon cancer: none.  Endometrial cancer: none.  Ovarian cancer: none.     She does report family history of fibroids.  She has no issues regarding her cycles.      OB History    Para Term  AB Living   0 0 0 0 0 0   SAB IAB Ectopic Multiple Live Births   0 0 0 0 0       History reviewed. No pertinent past medical history.  History reviewed. No pertinent surgical history.  Review of patient's allergies indicates:  No Known Allergies    Social History     Socioeconomic History    Marital status: Single   Tobacco Use    Smoking status: Never    Smokeless tobacco: Never   Substance and Sexual Activity    Alcohol use: Yes    Drug use: Yes     Types: Marijuana    Sexual activity: Yes     Partners: Male     Birth control/protection: Condom       History reviewed. No pertinent family history.    Medications:  Current Outpatient Medications on File Prior to Visit   Medication Sig Dispense Refill Last Dose    [DISCONTINUED] ondansetron (ZOFRAN-ODT) 4 MG TbDL Take 1 tablet (4 mg total) by mouth every 6 (six) hours as needed (nausea/vomiting). (Patient not taking: Reported on 2023) 30 tablet 1 Not Taking       Review of Systems   Constitutional: Negative for appetite change, fever and  "unexpected weight change.   ENT: Negative for ear pain, tinnitus, sinus congestion or trouble swallowing.   Respiratory: Negative for cough and shortness of breath.    Cardiovascular: Negative for chest pain and palpitations.   Gastrointestinal: Negative for abdominal distention, constipation, nausea and vomiting.   Genitourinary: Negative for dyspareunia, dysuria, hematuria and pelvic pain.        GYN ROS per HPI.   Musculoskeletal: Negative for gait problem and myalgias.   Skin: Negative for rash.   Neurological: Negative for dizziness, light-headedness and headaches.   Psychiatric/Behavioral: The patient is not nervous/anxious.      Objective:   /78 (BP Location: Left arm, Patient Position: Sitting, BP Method: Medium (Manual))   Resp 18   Ht 5' 1.5" (1.562 m)   Wt 69.5 kg (153 lb 3.5 oz)   LMP 03/02/2023 (Exact Date)   BMI 28.48 kg/m²     Physical Exam  Vitals reviewed. Exam conducted with a chaperone present.   HENT:      Head: Normocephalic and atraumatic.   Cardiovascular:      Rate and Rhythm: Normal rate and regular rhythm.   Pulmonary:      Effort: Pulmonary effort is normal. No accessory muscle usage or respiratory distress.   Chest:      Chest wall: No tenderness.   Breasts:     Breasts are symmetrical.      Right: No inverted nipple, mass, nipple discharge, skin change or tenderness.      Left: No inverted nipple, mass, nipple discharge, skin change or tenderness.   Abdominal:      General: Abdomen is flat.      Tenderness: There is no abdominal tenderness. There is no guarding.   Genitourinary:     General: Normal vulva.      Pubic Area: No rash.       Labia:         Right: No rash or tenderness.         Left: No rash or tenderness.       Urethra: No prolapse.      Vagina: Normal. No tenderness.      Cervix: No cervical motion tenderness, erythema or cervical bleeding.      Uterus: Not tender.       Adnexa:         Right: No mass or tenderness.          Left: No mass or tenderness.   "   Musculoskeletal:      Right lower leg: No edema.      Left lower leg: No edema.   Lymphadenopathy:      Upper Body:      Right upper body: No axillary adenopathy.      Left upper body: No axillary adenopathy.   Neurological:      General: No focal deficit present.      Mental Status: She is alert. Mental status is at baseline.        Assessment:     1. Screening for malignant neoplasm of cervix    2. Well woman exam    3. Well woman exam with routine gynecological exam    4. Contact with and (suspected) exposure to other communicable diseases      Plan:     27 y.o. female presents today for annual pap smear and exam.     1. Well woman exam  - Ambulatory referral/consult to Gynecology    2. Screening for malignant neoplasm of cervix  - Liquid-Based Pap Smear, Screening    3. Well woman exam with routine gynecological exam    4. Contact with and (suspected) exposure to other communicable diseases  - C. trachomatis/N. gonorrhoeae by AMP DNA [facility]; Cervicovaginal  - Vaginosis Screen by DNA Probe  - HIV 1/2 Ag/Ab (4th Gen); Future  - RPR; Future  - Hepatitis C Antibody; Future  - Hepatitis B Surface Antigen; Future      Follow up in about 1 year (around 3/3/2024) for annual exam or sooner if any GYN issues arise.    The above was reviewed and discussed with the patient.    Annual exam and screening issues based on the patient's age and family history were discussed.       - Pap testing performed.   - Mammogram N/A.  - Colonoscopy .  - Tobacco cessation  recommended smoking cessation, but patient would like to quit on their own.    - DEXA N/A.  - Counseled to take daily multivitamin. If patient is of reproductive age and not on contraception, to take prenatal vitamin. Patient has been counseled on the vitamin D and calcium requirements per ACOG recommendations.    Age    Calcium(mg/day)    Vitamin D (IU/day)  9-18     1300                       600  19-50   1000                       600  51-70   1200                        600  >70       1200                       800    STI testing.    The patient's questions were answered, and she agrees with the current plan.     The patient was counseled today on the new ACS guidelines for cervical cytology screening as well as the current recommendations for breast cancer screening. She was counseled to follow up with her PCP for other routine health maintenance.       Jacki Florence PA-C  03/03/2023

## 2023-03-04 LAB
C TRACH DNA SPEC QL NAA+PROBE: NOT DETECTED
N GONORRHOEA DNA SPEC QL NAA+PROBE: NOT DETECTED

## 2023-03-06 LAB — RPR SER QL: NORMAL

## 2023-03-07 DIAGNOSIS — N76.0 BACTERIAL VAGINOSIS: Primary | ICD-10-CM

## 2023-03-07 DIAGNOSIS — B96.89 BACTERIAL VAGINOSIS: Primary | ICD-10-CM

## 2023-03-07 DIAGNOSIS — B37.31 CANDIDAL VAGINITIS: ICD-10-CM

## 2023-03-07 LAB
BACTERIAL VAGINOSIS DNA: POSITIVE
CANDIDA GLABRATA DNA: NEGATIVE
CANDIDA KRUSEI DNA: NEGATIVE
CANDIDA RRNA VAG QL PROBE: POSITIVE
T VAGINALIS RRNA GENITAL QL PROBE: NEGATIVE

## 2023-03-07 RX ORDER — FLUCONAZOLE 150 MG/1
150 TABLET ORAL DAILY
Qty: 1 TABLET | Refills: 0 | Status: SHIPPED | OUTPATIENT
Start: 2023-03-07

## 2023-03-07 RX ORDER — METRONIDAZOLE 7.5 MG/G
GEL VAGINAL
Qty: 70 G | Refills: 0 | Status: SHIPPED | OUTPATIENT
Start: 2023-03-07

## 2023-03-08 LAB
FINAL PATHOLOGIC DIAGNOSIS: NORMAL
Lab: NORMAL

## 2023-03-31 ENCOUNTER — CLINICAL SUPPORT (OUTPATIENT)
Dept: REHABILITATION | Facility: HOSPITAL | Age: 28
End: 2023-03-31
Attending: STUDENT IN AN ORGANIZED HEALTH CARE EDUCATION/TRAINING PROGRAM
Payer: COMMERCIAL

## 2023-03-31 DIAGNOSIS — M25.511 CHRONIC RIGHT SHOULDER PAIN: ICD-10-CM

## 2023-03-31 DIAGNOSIS — G89.29 CHRONIC RIGHT SHOULDER PAIN: ICD-10-CM

## 2023-03-31 PROCEDURE — 97161 PT EVAL LOW COMPLEX 20 MIN: CPT | Mod: PN

## 2023-03-31 PROCEDURE — 97112 NEUROMUSCULAR REEDUCATION: CPT | Mod: PN

## 2023-03-31 PROCEDURE — 97530 THERAPEUTIC ACTIVITIES: CPT | Mod: PN

## 2023-03-31 NOTE — PATIENT INSTRUCTIONS
Access Code: [email or web address]  URL: [email or web address]  Date: 03/31/2023  Prepared by: Hugh Preciado

## 2023-03-31 NOTE — PLAN OF CARE
[facility]   Physical Therapy Initial Evaluation     Date: 3/31/2023   Name: Alexis Milligan  Clinic Number: [id number]    Therapy Diagnosis:   Encounter Diagnosis   Name Primary?    Chronic right shoulder pain      Physician: Windy Ordoñez MD    Physician Orders: PT Eval and Treat   Medical Diagnosis from Referral: M25.511,G89.29 (ICD-10-CM) - Chronic right shoulder pain  Evaluation Date: 3/31/2023  Authorization Period Expiration: 2/27/2024  Plan of Care Expiration: 5/26/2023   Progress Note Due: 5/1/2023  Visit # / Visits authorized: 1/ 1   Remaining Visits Scheduled - 00  PTA Consecutive Visits - 0/6    Eval Visit FOTO - 64 (3/31/2023)   5th Visit FOTO  - (Date/Score/Turn Green)   10th Visit FOTO - (Date/Score/Turn Green)   D/C FOTO -  (Date/Score/Turn Green)      Precautions: Standard and HTN     Time In: 8:00  Time Out: 8:45  Total Appointment Time (timed & untimed codes): 45 minutes    SUBJECTIVE     Date of onset: November 2022     Current Condition - Nabeel reports having Right shoulder pain since November 2022. She does workout and is unable to recall if there was any injury to the area. She is also an  and has problem with work.     Type of Pain: Pain is located to the lateral aspect of the Right shoulder at the insertion of the deltoid and described as Aching  Sleep: Pt is able to sleep throughout the night without waking up due to pain.     Aggravating Factors: Lifting, Driving, Work, and Abduction/External Rotation  Easing Factors: rest    Imaging: No recent imaging.     Prior Therapy: None.   Work/School:    Hobbies/Exercise: Gym and Volleyball   Hand Dominance: right    Prior Level of Function: Independent   Current Level of Function: Independent     Pain:  Current 0/10,   Worst 8/10,   Best 0/10     Non-Mechanical Origin:  Night Pain?  None.   Hx of Cancer?  None.   Trauma?  None.     Falls: No falls in the last year.   Red Flags: (-)  Numbness/Tingling      Patient Goals: Patient would like to decrease pain and increase recreational capacity.      Medical History:   Recently Placed on HTN medication. History of Scoliosis     Surgical History:   Alexis Milligan  has no past surgical history on file.    Medications:   Nabeel has a current medication list which includes the following prescription(s): fluconazole, hydrochlorothiazide, and metronidazole.    Allergies:   Review of patient's allergies indicates:  No Known Allergies     OBJECTIVE   OBSERVATIONS: Patient is a 27 year old female who ambulates with no AD and no apparent signs of distress.  No signs of atrophy at the SCM, traps, deltoids, supraspinatus.    POSTURE:  Patient is significant for moderate forward head and internally rotated shoulders. Upper t-spine flexion also observed.    Scapular Posture [place]   Anterior tipping YES   Winging YES (Right Shoulder)     SHOULDER ACTIVE RANGE OF MOTION    Left Right    Flexion Within Normal Limits  Within Normal Limits    Abduction Within Normal Limits  165°, pain   External Rotation 100° 80°, pain    Internal Rotation (Apley Scratch Test) T5 T8, pain    Extension 80° 80°, pain    SHOULDER PASSIVE RANGE OF MOTION    Flexion Within Normal Limits  Within Normal Limits    Abduction Within Normal Limits  Within Normal Limits    External Rotation  Within Normal Limits  Within Normal Limits    Internal Rotation (Apley Scratch Test) Within Normal Limits  Within Normal Limits    Extension  Within Normal Limits  Within Normal Limits      UPPER LOWER EXTREMITY STRENGTH    Left  Right    Shoulder Flexion 4/5 4-/5   Shoulder Abduction 4+/5 4-/5   Shoulder External Rotation 4+/5 4-/5   Shoulder Internal Rotation 4+/5 4/5   Elbow Flexion 5/5 5/5   Elbow Extension 5/5 5/5     PRONE SCAPULAR STRENGTH    Left Right    Rhomboids (Thumbs Up) 4-/5 4-/5   Mid Trapezius (Thumbs Down) 4-/5 4-/5     SPECIAL TESTS   Impingement Cluster   [place] Positive     Painful Arc Sign Positive    Infraspinatus MMT Positive    Additional Impingement   NEER Negative    Cross-Arm Adduction Negative    Anterior Instability Cluster    Anterior Apprehension Test Negative    Relocation Test Negative       Saw Relocation Test  Negative    Drop Arm Test  Negative    Full Can  Positive    Empty Can Positive    Sulcus Sign  Positive      PALPATION:  Patient reports primary pain region along the lateral aspect of the Right shoulder *    Shoulder Capsule Mobility   Anterior - Hypermobile  Posterior - Hypermobile  Inferior - Hypermobile    Limitation/Restriction for FOTO DASH Survey    Therapist reviewed FOTO scores for Alexis Milligan on 3/31/2023.   FOTO documents entered into [employer] - see Media section.    Limitation Score: 18%       TREATMENT     Total Treatment time (time-based codes) separate from Evaluation: 25 minutes      Nabeel received the treatments listed below:      neuromuscular re-education activities to improve: Balance, Coordination, Kinesthetic, Proprioception, Posture, and Neuromuscular Recruitment for 15 minutes. The following activities were included:  Standing Isometric Shoulder Internal Rotation at Doorway  2 sets - 10 reps - 3 hold  Standing Isometric Shoulder External Rotation with Doorway  2 sets - 10 reps - 3 hold  Standing Isometric Shoulder Flexion with Doorway - Arm Bent 2 sets - 10 reps - 3 hold  Standing Isometric Shoulder Abduction with Doorway - Arm Bent  2 sets - 10 reps - 3 hold  Bent Over Single Arm Shoulder Row with Dumbbell  2 sets - 10 reps  Standing Wall Ball Circles with Mini Swiss Ball  3 sets - 8 reps - 3 hold    therapeutic activities to improve functional performance for 10  minutes, including:  Pt was educated on the prognosis and pathology of impingement syndrome. The patient verbalized understanding and compliance with HEP.    Patient was also informed to avoid gym exercises that involve the Right shoulder at this time for 2 weeks.        PATIENT EDUCATION AND HOME EXERCISES     Education provided:     - Pt/family was provided educational information, including: role of PT, goals for PT, scheduling - pt verbalized understanding. Discussed insurance limitations with pt.     Written Home Exercises Provided: yes. Exercises were reviewed and Nabeel was able to demonstrate them prior to the end of the session.  Nabeel demonstrated good  understanding of the education provided. See EMR under Patient Instructions for exercises provided during therapy sessions.    ASSESSMENT     Nabeel is a 27 y.o. female referred to outpatient Physical Therapy with a medical diagnosis of Chronic right shoulder pain. 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 Limitations are noted with abduction/External Rotation, donning on clothes, gym exercises, driving, and occupational tasks. Primary impairment is pain, secondary to decreased ROM, laxity, endurance, strength, coordination, stability/balance, muscular activation, and function to the Right shoulder. Pt. would benefit from PT to address impairments listed above to return to functional independence.    Intervention strategies designed to address these impairments will be instrumental in achieving the stated functional goals, including her ability to safely perform mobility tasks. Based on the examination, the patient's rehabilitation potential to achieve functional goals is good due to age and activity levels.     Patient prognosis is Good.   Patient will benefit from skilled outpatient Physical Therapy to address the deficits stated above and in the chart below, provide patient /family education, and to maximize patientt's level of independence.     Plan of care discussed with patient: Yes  Patient's spiritual, cultural and educational needs considered and patient is agreeable to the plan of care and goals as stated below:     Anticipated Barriers for therapy: None.     Medical Necessity  is demonstrated by the following  History  Co-morbidities and personal factors that may impact the plan of care Co-morbidities:   HTN    Personal Factors:   age     low   Examination  Body Structures and Functions, activity limitations and participation restrictions that may impact the plan of care Body Regions:   upper extremities    Body Systems:    gross symmetry  ROM  strength  gross coordinated movement  transfers  transitions  motor control  motor learning    Participation Restrictions:   None.     Activity limitations:   Learning and applying knowledge  no deficits    General Tasks and Commands  no deficits    Communication  no deficits    Mobility  lifting and carrying objects  driving (bike, car, motorcycle)    Self care  washing oneself (bathing, drying, washing hands)  caring for body parts (brushing teeth, shaving, grooming)  toileting  dressing    Domestic Life  shopping  cooking  doing house work (cleaning house, washing dishes, laundry)  assisting others    Interactions/Relationships  no deficits    Life Areas  no deficits    Community and Social Life  no deficits         low   Clinical Presentation stable and uncomplicated low   Decision Making/ Complexity Score: low     Goals:  Short Term Goals: 4 weeks   Patient will be independent with HEP at home to increase PT compliance.     Patient will be independent with driving for 30 min with < 3/10 pain to increase functional capacity     Patient will be independent with increasing Right shoulder active range of motion to full mobility.           Long Term Goals: 8 weeks   Patient will decrease DASH score to < 10% to increase functional capacity.     Patient will be independent with going to the gym for 1 week with < 2/10 pain to increase recreational capacity     Patient will be independent with working for 1 week with < 2/10 pain to increase occupational capacity           PLAN   Plan of care Certification: 3/31/2023 to 5/26/2023 .    Outpatient  Physical Therapy 1 times weekly for 8 weeks to include the following interventions: Cervical/Lumbar Traction, Electrical Stimulation (IFC), Manual Therapy, Moist Heat/ Ice, Neuromuscular Re-ed, Patient Education, Self Care, Therapeutic Activities, Therapeutic Exercise, Ultrasound, and Trigger Point Dry Needling.     Hugh Preciado, PT, DPT    Pt may be seen by PTA as part of the rehabilitation team.     Therapist: Hugh Preciado, PT, DPT 3/31/2023    I CERTIFY THE NEED FOR THESE SERVICES FURNISHED UNDER THIS PLAN OF TREATMENT AND WHILE UNDER MY CARE   Physician's comments:     Physician's Signature: ___________________________________________________

## 2023-03-31 NOTE — PROGRESS NOTES
[facility]   Physical Therapy Initial Evaluation     Date: 3/31/2023   Name: Alexis Milligan  Clinic Number: [id number]    Therapy Diagnosis:   Encounter Diagnosis   Name Primary?    Chronic right shoulder pain      Physician: Windy Ordoñez MD    Physician Orders: PT Eval and Treat   Medical Diagnosis from Referral: M25.511,G89.29 (ICD-10-CM) - Chronic right shoulder pain  Evaluation Date: 3/31/2023  Authorization Period Expiration: 2/27/2024  Plan of Care Expiration: 5/26/2023   Progress Note Due: 5/1/2023  Visit # / Visits authorized: 1/ 1   Remaining Visits Scheduled - 00  PTA Consecutive Visits - 0/6    Eval Visit FOTO - 64 (3/31/2023)   5th Visit FOTO  - (Date/Score/Turn Green)   10th Visit FOTO - (Date/Score/Turn Green)   D/C FOTO -  (Date/Score/Turn Green)      Precautions: Standard and HTN     Time In: 8:00  Time Out: 8:45  Total Appointment Time (timed & untimed codes): 45 minutes      Please see Plan of Care notation section to view Nabeel Braunigan Initial Evaluation.       Hugh Preciado, PT,DPT

## 2023-04-03 ENCOUNTER — CLINICAL SUPPORT (OUTPATIENT)
Dept: REHABILITATION | Facility: HOSPITAL | Age: 28
End: 2023-04-03
Attending: STUDENT IN AN ORGANIZED HEALTH CARE EDUCATION/TRAINING PROGRAM
Payer: COMMERCIAL

## 2023-04-03 DIAGNOSIS — G89.29 CHRONIC RIGHT SHOULDER PAIN: Primary | ICD-10-CM

## 2023-04-03 DIAGNOSIS — M25.511 CHRONIC RIGHT SHOULDER PAIN: Primary | ICD-10-CM

## 2023-04-03 PROCEDURE — 97112 NEUROMUSCULAR REEDUCATION: CPT | Mod: PN,CQ

## 2023-04-03 PROCEDURE — 97110 THERAPEUTIC EXERCISES: CPT | Mod: PN,CQ

## 2023-04-03 NOTE — PROGRESS NOTES
Physical Therapy Daily Treatment Note         Name: [facility]abe[facility] Number: [id number]    Therapy Diagnosis:   Encounter Diagnosis   Name Primary?    Chronic right shoulder pain Yes     Physician: Windy Ordoñez MD    Visit Date: 4/3/2023    Physician Orders: PT Eval and Treat   Medical Diagnosis from Referral: M25.511,G89.29 (ICD-10-CM) - Chronic right shoulder pain  Evaluation Date: 3/31/2023  Authorization Period Expiration: 2024  Plan of Care Expiration: 2023   Progress Note Due: 2023  Visit # / Visits authorized:    Remaining Visits Scheduled -  Consecutive Visits -     5th Visit FOTO - (Date, Score/ turn green)  10th Visit FOTO -  (Date, Score/ turn green )  D/C FOTO - (Date, Score/ turn green )    Time In: 11:05  Time Out: 11:45  Billable Time: 45 minutes  Non-Billable Time: 00 minutes    Precautions: Standard and HTN  Insurance: Payor: [employer] / Plan: [facility] BASIC / Product Type: PPO /     Subjective     Pt reports: Pain with everyday tasks.  She is compliant with home exercise program.  Response to previous treatment: 1st session    Pre-Treatment Pain Ratin/10  Post-Treatment Pain Ratin/10  Location: right shoulder      Objective     Nabeel received therapeutic exercises to develop strength, endurance, ROM, and flexibility for 20 minutes including:    Rows BTB 3x10   Supine Flexion RTB 2x10  Sidelying ER #1 3x10  Sidelying Abduction RTB 2x10  Shoulder extension BTB 2x10  Serratus punches #5 3x10   Resisted rows BTB 3x10    *Bold exercise performed       Nabeel participated in neuromuscular re-education activities to improve: Balance, Coordination, and Kinesthetic for 20 minutes. The following activities were included:  Standing Isometric Shoulder Internal Rotation at Doorway  2x10 - 3 hold  Standing Isometric Shoulder External Rotation with Doorway  2x10 reps - 3 hold  Standing Isometric Shoulder Flexion with Doorway - Arm Bent 2x10 reps - 3  hold  Standing Isometric Shoulder Abduction with Doorway - Arm Bent  2 sets - 10 reps - 3 hold  Standing Wall Ball Circles with Mini Swiss Ball  3 sets - 8 reps - 3 hold      Nabeel received the following manual therapy techniques: Joint mobilizations were applied to the: R shoulder for 00 minutes, including:    Visit Number:  1 out of 20   Manual Therapy  (amplitude and time)     GH joint mobilization A/P and S/I NP                         Home Exercises Provided and Patient Education Provided     Education provided:   - Continue with HEP    Written Home Exercises Provided: Patient instructed to cont prior HEP.  Exercises were reviewed and Nabeel was able to demonstrate them prior to the end of the session.  Nabeel demonstrated good  understanding of the education provided.     See EMR under Patient Instructions for exercises provided prior visit.    Assessment     Pt tolerated session well. Isometric ER and abduction the most difficult secondary to pain. Sidelying ER also challenging due to supraspinatus weakness and pain, rest breaks needed. Tolerance for shoulder exercise is low due poor endurance of shoulder stabilizing muscles. Nabeel had no adverse effects with exercises today. Nabeel will continue to benefit from skilled therapy.     Nabeel Is progressing well towards Her goals.     Pt prognosis is Good.     Pt will continue to benefit from skilled outpatient physical therapy to address the deficits listed in the problem list box on initial evaluation, provide pt/family education and to maximize pt's level of independence in the home and community environment.     Pt's spiritual, cultural and educational needs considered and pt agreeable to plan of care and goals.    Anticipated barriers to physical therapy: none    Goals:  Short Term Goals: 4 weeks   Patient will be independent with HEP at home to increase PT compliance.   Progressing 4/3/2023     Patient will be independent with driving for 30 min with <  3/10 pain to increase functional capacity   Progressing 4/3/2023     Patient will be independent with increasing Right shoulder active range of motion to full mobility.   Progressing 4/3/2023              Long Term Goals: 8 weeks   Patient will decrease DASH score to < 10% to increase functional capacity.      Patient will be independent with going to the gym for 1 week with < 2/10 pain to increase recreational capacity      Patient will be independent with working for 1 week with < 2/10 pain to increase occupational capacity               Plan     Continued with current POC      Juan Manuel Fonseca PTA ,  4/3/2023

## 2023-04-13 NOTE — PROGRESS NOTES
Physical Therapy Daily Treatment Note         [id number]    Therapy Diagnosis:   Name Primary?    Chronic right shoulder pain      Physician: Windy Ordoñez MD    Visit Date: 2023    Physician Orders: PT Eval and Treat   Medical Diagnosis from Referral: M25.511,G89.29 (ICD-10-CM) - Chronic right shoulder pain  Evaluation Date: 3/31/2023  Authorization Period Expiration: 2024  Plan of Care Expiration: 2023   Progress Note Due: 2023  Visit # / Visits authorized:    Remaining Visits Scheduled -  Consecutive Visits -     5th Visit FOTO - (Date, Score/ turn green)  10th Visit FOTO -  (Date, Score/ turn green )  D/C FOTO - (Date, Score/ turn green )    Time In: 11:22 AM (arrived late)  Time Out: 11:48 AM  Billable Time: 25 minutes (1TE, 1NR, 1MT)  Non-Billable Time: 00 minutes    Precautions: Standard and HTN  Insurance: Payor: [employer] / Plan: [employer] BASIC / Product Type: PPO /     Subjective     Pt reports: Pain with compression and overhead activity.   She is compliant with home exercise program.  Response to previous treatment: pain relief and improvement in activity tolerance of R shoulder    Pre-Treatment Pain Rating: 3/10  Post-Treatment Pain Ratin/10  Location: right shoulder      Objective     Nabeel received therapeutic exercises to develop strength, endurance, ROM, and flexibility for 08 minutes including:    Rows BTB 3x10   Supine Flexion RTB 2x10  Sidelying ER #1 3x10  Sidelying Abduction RTB 2x10  Shoulder extension BTB 2x10  Serratus punches 3x10 (fast on the way up, slow on the way down)  Resisted rows BTB 3x10    *Bold exercise performed     Nabeel participated in neuromuscular re-education activities to improve: Balance, Coordination, and Kinesthetic for 09 minutes. The following activities were included:  Standing Isometric Shoulder Internal Rotation at Doorway  2x10 - 3 hold  Standing Isometric Shoulder  External Rotation with Doorway  2x10 reps - 3 hold  Standing Isometric Shoulder Flexion with Doorway - Arm Bent 2x10 reps - 3 hold  Standing Isometric Shoulder Abduction with Doorway - Arm Bent  2 sets - 10 reps - 3 hold  Standing Wall Ball Circles with Mini Swiss Ball  3 sets - 8 reps - 3 hold  Prone T's 2x10  Prone I's 2x10  Scapular retractions 3x10      Nabeel received the following manual therapy techniques: Joint mobilizations were applied to the: R shoulder for 08 minutes, including:    Visit Number:  1 out of 20   Manual Therapy  (amplitude and time)     GH joint mobilization A/P and S/I NP   Scapular mobilization in L sidelying Performed   STM of periscapular muscles including cross friction massage of rotator cuff musculature Performed                 Home Exercises Provided and Patient Education Provided     Education provided:   - Continue with HEP    Written Home Exercises Provided: Patient instructed to cont prior HEP.  Exercises were reviewed and Nabeel was able to demonstrate them prior to the end of the session.  Nabeel demonstrated good  understanding of the education provided.     See EMR under Patient Instructions for exercises provided prior visit.    Assessment     Pt tolerated session well, no increase in pain was noted throughout treatment session. Patient was able to tolerate all prescribed interventions with no change in symptoms. Patient reported relief in symptoms and ease of movement after manual therapy techniques to involved musculature of R shoulder. Continue current treatment plan as noted.    Nabeel Is progressing well towards Her goals.     Pt prognosis is Good.     Pt will continue to benefit from skilled outpatient physical therapy to address the deficits listed in the problem list box on initial evaluation, provide pt/family education and to maximize pt's level of independence in the home and community environment.     Pt's spiritual, cultural and educational needs considered  and pt agreeable to plan of care and goals.    Anticipated barriers to physical therapy: none    Goals:  Short Term Goals: 4 weeks   Patient will be independent with HEP at home to increase PT compliance.   Progressing 4/14/2023     Patient will be independent with driving for 30 min with < 3/10 pain to increase functional capacity   Progressing 4/14/2023     Patient will be independent with increasing Right shoulder active range of motion to full mobility.   Progressing 4/14/2023              Long Term Goals: 8 weeks   Patient will decrease DASH score to < 10% to increase functional capacity.      Patient will be independent with going to the gym for 1 week with < 2/10 pain to increase recreational capacity      Patient will be independent with working for 1 week with < 2/10 pain to increase occupational capacity               Plan     Continued with current POC      Dayron Fuentes, PT, DPT  4/13/2023

## 2023-04-14 ENCOUNTER — CLINICAL SUPPORT (OUTPATIENT)
Dept: REHABILITATION | Facility: HOSPITAL | Age: 28
End: 2023-04-14
Attending: STUDENT IN AN ORGANIZED HEALTH CARE EDUCATION/TRAINING PROGRAM
Payer: COMMERCIAL

## 2023-04-14 DIAGNOSIS — G89.29 CHRONIC RIGHT SHOULDER PAIN: Primary | ICD-10-CM

## 2023-04-14 DIAGNOSIS — M25.511 CHRONIC RIGHT SHOULDER PAIN: Primary | ICD-10-CM

## 2023-04-14 PROCEDURE — 97110 THERAPEUTIC EXERCISES: CPT | Mod: PN

## 2023-04-14 PROCEDURE — 97112 NEUROMUSCULAR REEDUCATION: CPT | Mod: PN

## 2023-04-14 PROCEDURE — 97140 MANUAL THERAPY 1/> REGIONS: CPT | Mod: PN

## 2023-04-21 ENCOUNTER — CLINICAL SUPPORT (OUTPATIENT)
Dept: REHABILITATION | Facility: HOSPITAL | Age: 28
End: 2023-04-21
Attending: STUDENT IN AN ORGANIZED HEALTH CARE EDUCATION/TRAINING PROGRAM
Payer: COMMERCIAL

## 2023-04-21 DIAGNOSIS — G89.29 CHRONIC RIGHT SHOULDER PAIN: Primary | ICD-10-CM

## 2023-04-21 DIAGNOSIS — M25.511 CHRONIC RIGHT SHOULDER PAIN: Primary | ICD-10-CM

## 2023-04-21 PROCEDURE — 97110 THERAPEUTIC EXERCISES: CPT | Mod: PN,CQ

## 2023-04-21 PROCEDURE — 97112 NEUROMUSCULAR REEDUCATION: CPT | Mod: PN,CQ

## 2023-04-21 NOTE — PROGRESS NOTES
Physical Therapy Daily Treatment Note         Name: Alexis Milligan  Clinic Number: [id number]    Therapy Diagnosis:   Name Primary?    Chronic right shoulder pain      Physician: Windy Ordoñez MD    Visit Date: 2023    Physician Orders: PT Eval and Treat   Medical Diagnosis from Referral: M25.511,G89.29 (ICD-10-CM) - Chronic right shoulder pain  Evaluation Date: 3/31/2023  Authorization Period Expiration: 2024  Plan of Care Expiration: 2023   Progress Note Due: 2023  Visit # / Visits authorized: 3/ 20   Remaining Visits Scheduled -   PTA Consecutive Visits -     5th Visit FOTO - (Date, Score/ turn green)  10th Visit FOTO -  (Date, Score/ turn green )  D/C FOTO - (Date, Score/ turn green )    Time In: 11:13 AM (arrived late)  Time Out: 11:45 PM  Billable Time: 32 minutes   Non-Billable Time: 00 minutes    Precautions: Standard and HTN  Insurance: Payor: [employer] / Plan: [employer] BASIC / Product Type: PPO /     Subjective     Pt reports: No significant pain today with rest or lifting arm. Pt has returned to shoulder exercises at the gym without   She is compliant with home exercise program.  Response to previous treatment: pain relief and improvement in activity tolerance of R shoulder    Pre-Treatment Pain Ratin/10  Post-Treatment Pain Ratin/10  Location: right shoulder      Objective     Nabeel received therapeutic exercises to develop strength, endurance, ROM, and flexibility for 20 minutes including:    Rows BTB 3x10   Supine Flexion RTB 2x10  Sidelying ER #1 3x10  Sidelying Abduction RTB 2x10  Shoulder extension BTB 2x10  Serratus punches 3x10 (fast on the way up, slow on the way down)  BTB Resisted ER/IR 3x10   Standing shoulder scaption/abduction  #2 3x10    *Bold exercise performed     Nabeel participated in neuromuscular re-education activities to improve: Balance, Coordination, and Kinesthetic for 12 minutes. The following activities were included:  Standing  Isometric Shoulder Internal Rotation at Doorway  2x10 - 3 hold  Standing Isometric Shoulder External Rotation with Doorway  2x10 reps - 3 hold  Standing Isometric Shoulder Flexion with Doorway - Arm Bent 2x10 reps - 3 hold  Standing Isometric Shoulder Abduction with Doorway - Arm Bent  2 sets - 10 reps - 3 hold  Standing Wall Ball Circles with Mini Swiss Ball  3 sets - 8 reps - 3 hold  Prone T's 3x10  Prone I's 3x10  Scapular retractions 3x10      Nabeel received the following manual therapy techniques: Joint mobilizations were applied to the: R shoulder for 00 minutes, including:    Visit Number:  1 out of 20   Manual Therapy  (amplitude and time)     GH joint mobilization A/P and S/I NP   Scapular mobilization in L sidelying NP   STM of periscapular muscles including cross friction massage of rotator cuff musculature NP                 Home Exercises Provided and Patient Education Provided     Education provided:   - Continue with HEP    Written Home Exercises Provided: Patient instructed to cont prior HEP.  Exercises were reviewed and Nabeel was able to demonstrate them prior to the end of the session.  Nabeel demonstrated good  understanding of the education provided.     See EMR under Patient Instructions for exercises provided prior visit.    Assessment     Pt tolerated session well. Weighted scaption and abduction added to strengthen shoulder girdle. Pt tolerated well without pain, few rest breaks needed. Weight added and resistance increased to multiple exercises to strengthen rotator cuff muscular and scapular stabilizers. Nabeel had no adverse effects with increased weight and resistance and will continue to benefit from skilled therapy.     Nabeel Is progressing well towards Her goals.     Pt prognosis is Good.     Pt will continue to benefit from skilled outpatient physical therapy to address the deficits listed in the problem list box on initial evaluation, provide pt/family education and to maximize  pt's level of independence in the home and community environment.     Pt's spiritual, cultural and educational needs considered and pt agreeable to plan of care and goals.    Anticipated barriers to physical therapy: none    Goals:  Short Term Goals: 4 weeks   Patient will be independent with HEP at home to increase PT compliance.  Goal met     Patient will be independent with driving for 30 min with < 3/10 pain to increase functional capacity   Goal met      Patient will be independent with increasing Right shoulder active range of motion to full mobility.   Progressing 4/21/2023              Long Term Goals: 8 weeks   Patient will decrease DASH score to < 10% to increase functional capacity.  Progressing 4/21/2023      Patient will be independent with going to the gym for 1 week with < 2/10 pain to increase recreational capacity  Goal met      Patient will be independent with working for 1 week with < 2/10 pain to increase occupational capacity  Progressing 4/21/2023               Plan     Continued with current POC      Juan Manuel Fonseca PTA,   4/21/2023

## 2023-05-03 ENCOUNTER — DOCUMENTATION ONLY (OUTPATIENT)
Dept: REHABILITATION | Facility: HOSPITAL | Age: 28
End: 2023-05-03
Payer: COMMERCIAL

## 2023-05-03 NOTE — PROGRESS NOTES
PT/PTA met face to face to discuss pt's treatment plan and progress towards established goals. Pt will be seen by a physical therapist minimally every 6th visit or every 30 days.    Please see Updated Plan of Care for changes and updated goals.       Juan Manuel Fonseca, PTA

## 2023-05-12 ENCOUNTER — CLINICAL SUPPORT (OUTPATIENT)
Dept: REHABILITATION | Facility: HOSPITAL | Age: 28
End: 2023-05-12
Attending: STUDENT IN AN ORGANIZED HEALTH CARE EDUCATION/TRAINING PROGRAM
Payer: COMMERCIAL

## 2023-05-12 DIAGNOSIS — M25.511 CHRONIC RIGHT SHOULDER PAIN: Primary | ICD-10-CM

## 2023-05-12 DIAGNOSIS — G89.29 CHRONIC RIGHT SHOULDER PAIN: Primary | ICD-10-CM

## 2023-05-12 PROCEDURE — 97112 NEUROMUSCULAR REEDUCATION: CPT | Mod: PN,CQ

## 2023-05-12 PROCEDURE — 97110 THERAPEUTIC EXERCISES: CPT | Mod: PN,CQ

## 2023-05-12 NOTE — PROGRESS NOTES
Physical Therapy Daily Treatment Note         [id number]    Therapy Diagnosis:   Name Primary?    Chronic right shoulder pain      Physician: Windy Ordoñez MD    Visit Date: 2023    Physician Orders: PT Eval and Treat   Medical Diagnosis from Referral: M25.511,G89.29 (ICD-10-CM) - Chronic right shoulder pain  Evaluation Date: 3/31/2023  Authorization Period Expiration: 2024  Plan of Care Expiration: 2023   Progress Note Due: 2023  Visit # / Visits authorized:    Remaining Visits Scheduled -   PTA Consecutive Visits -  Visit FOTO - (Date, Score/ turn green)  10th Visit FOTO -  (Date, Score/ turn green )  D/C FOTO - (Date, Score/ turn green )    Time In: 11:04 AM   Time Out: 11:45 PM  Billable Time: 41 minutes   Non-Billable Time: 00 minutes    Precautions: Standard and HTN  Insurance: Payor: [employer] [facility] / Plan: [facility] BASIC / Product Type: PPO /     Subjective     Pt reports: Shoulder and upper traps feels tight today.  She is compliant with home exercise program.  Response to previous treatment:     Pre-Treatment Pain Ratin/10  Post-Treatment Pain Ratin/10  Location: right shoulder      Objective     Nabeel received therapeutic exercises to develop strength, endurance, ROM, and flexibility for 25 minutes including:    Levator scap 3x30s  Upper Trap 3x30s  Rows BTB 3x10   Supine Flexion RTB 2x10  Sidelying ER #1 3x10  Sidelying Abduction RTB 2x10  Shoulder extension BTB 2x10  Serratus punches 3x10 (fast on the way up, slow on the way down)  BTB Resisted ER/IR 3x10   Standing shoulder scaption/abduction  #2 3x10      *Bold exercise performed     Nabeel participated in neuromuscular re-education activities to improve: Balance, Coordination, and Kinesthetic for 13 minutes. The following activities were included:  Standing Isometric Shoulder Internal Rotation at Doorway  2x10 - 3 hold  Standing Isometric Shoulder External  Rotation with Doorway  2x10 reps - 3 hold  Standing Isometric Shoulder Flexion with Doorway - Arm Bent 2x10 reps - 3 hold  Standing Isometric Shoulder Abduction with Doorway - Arm Bent  2 sets - 10 reps - 3 hold  Standing Wall Ball Circles with Mini Swiss Ball  3 sets - 8 reps - 3 hold  Prone T's 3x10  Prone I's 3x10  Scapular retractions 3x10  Truckers pose YTB 3x8      Nabeel received the following manual therapy techniques: Joint mobilizations were applied to the: R shoulder for 00 minutes, including:    Visit Number:  1 out of 20   Manual Therapy  (amplitude and time)     GH joint mobilization A/P and S/I NP   Scapular mobilization in L sidelying NP   STM of periscapular muscles including cross friction massage of rotator cuff musculature NP             Nabeel participated in dynamic functional therapeutic activities to improve functional performance for 3  minutes, including:    Visit Number:  4 out of 20   Activities performed   (duration/resistance)     Overhead reaching Blue weight Done                        Home Exercises Provided and Patient Education Provided     Education provided:   - Continue with HEP    Written Home Exercises Provided: Patient instructed to cont prior HEP.  Exercises were reviewed and Nabeel was able to demonstrate them prior to the end of the session.  Nabele demonstrated good  understanding of the education provided.     See EMR under Patient Instructions for exercises provided prior visit.    Assessment     Pt tolerated session well. Overhead reaching added to improve ADLs and functional use of right arm. Truckers pose added to strengthen ROM musculature. Pt tolerated all exercises well, can benefit from verbal cues to slow speed of reps to increase muscular activation. Excessive ER ROM noted with resisted ER, Verbal cues given to reduce ROM. Nabeel had no adverse effects with exercises and will continue to benefit from skilled therapy.     Nabeel Is progressing well towards Her  goals.     Pt prognosis is Good.     Pt will continue to benefit from skilled outpatient physical therapy to address the deficits listed in the problem list box on initial evaluation, provide pt/family education and to maximize pt's level of independence in the home and community environment.     Pt's spiritual, cultural and educational needs considered and pt agreeable to plan of care and goals.    Anticipated barriers to physical therapy: none    Goals:  Short Term Goals: 4 weeks   Patient will be independent with HEP at home to increase PT compliance.  Goal met     Patient will be independent with driving for 30 min with < 3/10 pain to increase functional capacity   Goal met      Patient will be independent with increasing Right shoulder active range of motion to full mobility.   Progressing 5/12/2023              Long Term Goals: 8 weeks   Patient will decrease DASH score to < 10% to increase functional capacity.  Progressing 5/12/2023      Patient will be independent with going to the gym for 1 week with < 2/10 pain to increase recreational capacity  Goal met      Patient will be independent with working for 1 week with < 2/10 pain to increase occupational capacity  Progressing 5/12/2023               Plan     Continued with current POC      Juan Manuel Fonseca PTA,   5/12/2023

## 2023-05-19 ENCOUNTER — CLINICAL SUPPORT (OUTPATIENT)
Dept: REHABILITATION | Facility: HOSPITAL | Age: 28
End: 2023-05-19
Attending: STUDENT IN AN ORGANIZED HEALTH CARE EDUCATION/TRAINING PROGRAM
Payer: COMMERCIAL

## 2023-05-19 DIAGNOSIS — G89.29 CHRONIC RIGHT SHOULDER PAIN: Primary | ICD-10-CM

## 2023-05-19 DIAGNOSIS — M25.511 CHRONIC RIGHT SHOULDER PAIN: Primary | ICD-10-CM

## 2023-05-19 PROCEDURE — 97112 NEUROMUSCULAR REEDUCATION: CPT | Mod: PN

## 2023-05-19 PROCEDURE — 97110 THERAPEUTIC EXERCISES: CPT | Mod: PN

## 2023-05-19 PROCEDURE — 97140 MANUAL THERAPY 1/> REGIONS: CPT | Mod: PN

## 2023-05-19 NOTE — PROGRESS NOTES
Physical Therapy Daily Treatment Note         [id number]    Therapy Diagnosis:   Name Primary?    Chronic right shoulder pain      Physician: Windy Ordoñez MD    Visit Date: 2023    Physician Orders: PT Eval and Treat   Medical Diagnosis from Referral: M25.511,G89.29 (ICD-10-CM) - Chronic right shoulder pain  Evaluation Date: 3/31/2023  Authorization Period Expiration: 2024  Plan of Care Expiration: 2023   Progress Note Due: 2023  Visit # / Visits authorized:    Remaining Visits Scheduled -   PTA Consecutive Visits -     5th Visit FOTO - (Date, Score/ turn green)  10th Visit FOTO -  (Date, Score/ turn green )  D/C FOTO - (Date, Score/ turn green )    Time In: 11:00 AM   Time Out: 11:45 PM  Billable Time: 45 minutes (1TE, 2NM, 1MT)  Non-Billable Time: 00 minutes    Precautions: Standard and HTN  Insurance: Payor: [employer] / Plan: [employer] BASIC / Product Type: PPO /     Subjective     Pt reports: aching at rest with continued working of shoulder during workouts  She is compliant with home exercise program.  Response to previous treatment:     Pre-Treatment Pain Ratin/10  Post-Treatment Pain Ratin/10  Location: right shoulder      Objective     Nabeel received therapeutic exercises to develop strength, endurance, ROM, and flexibility for 10 minutes including:    Levator scap 3x30s  Upper Trap 3x30s  Rows BTB 3x10   Supine Flexion RTB 2x10  Sidelying ER #1 3x10  Sidelying Abduction RTB 2x10  Shoulder extension BTB 2x10  Serratus punches 3x10 (fast on the way up, slow on the way down) 6lb Dbs  Bent over rows with thoracic rotation 4lb DB 3x10    BTB Resisted ER/IR 3x10   Standing shoulder scaption/abduction  #2 3x10      *Bold exercise performed     Nabeel participated in neuromuscular re-education activities to improve: Balance, Coordination, and Kinesthetic for 25 minutes. The following activities were included:  Standing  Isometric Shoulder Internal Rotation at Doorway  2x10 - 3 hold  Standing Isometric Shoulder External Rotation with Doorway  2x10 reps - 3 hold  Standing Isometric Shoulder Flexion with Doorway - Arm Bent 2x10 reps - 3 hold  Standing Isometric Shoulder Abduction with Doorway - Arm Bent  2 sets - 10 reps - 3 hold  Standing Wall Ball Circles with Mini Swiss Ball  3 sets - 8 reps - 3 hold  Crawling on table w/o RB 10 laps forward/backward  Prone T's 3x10 w/ER cue  Prone I's 3x10 w/ER cue  Scapular retractions 3x10  Truckers pose YTB 3x8      Nabeel received the following manual therapy techniques: Joint mobilizations were applied to the: R shoulder for 10 minutes, including:    Visit Number:  1 out of 20   Manual Therapy  (amplitude and time)     GH joint mobilization A/P and S/I NP   Scapular mobilization in L sidelying Performed   STM of periscapular muscles including cross friction massage of rotator cuff musculature Performed             Nabeel participated in dynamic functional therapeutic activities to improve functional performance for 00  minutes, including:    Visit Number:  4 out of 20   Activities performed   (duration/resistance)     Overhead reaching Blue weight NP                        Home Exercises Provided and Patient Education Provided     Education provided:   - Continue with HEP    Written Home Exercises Provided: Patient instructed to cont prior HEP.  Exercises were reviewed and Nabeel was able to demonstrate them prior to the end of the session.  Nabeel demonstrated good  understanding of the education provided.     See EMR under Patient Instructions for exercises provided prior visit.    Assessment     Pt tolerated session well. Patient reported relief in shoulder dysfunction and pain with manual therapy techniques. Patient received education on pain relief strategies, functional mechanics of shoulder function, and postural correctives. Patient tolerated advancements in exercise activity well  with no report of pain or discomfort. Continue with current POC.    Nabeel Is progressing well towards Her goals.     Pt prognosis is Good.     Pt will continue to benefit from skilled outpatient physical therapy to address the deficits listed in the problem list box on initial evaluation, provide pt/family education and to maximize pt's level of independence in the home and community environment.     Pt's spiritual, cultural and educational needs considered and pt agreeable to plan of care and goals.    Anticipated barriers to physical therapy: none    Goals:  Short Term Goals: 4 weeks   Patient will be independent with HEP at home to increase PT compliance.  Goal met     Patient will be independent with driving for 30 min with < 3/10 pain to increase functional capacity   Goal met      Patient will be independent with increasing Right shoulder active range of motion to full mobility.   Progressing 5/19/2023              Long Term Goals: 8 weeks   Patient will decrease DASH score to < 10% to increase functional capacity.  Progressing 5/19/2023      Patient will be independent with going to the gym for 1 week with < 2/10 pain to increase recreational capacity  Goal met      Patient will be independent with working for 1 week with < 2/10 pain to increase occupational capacity  Progressing 5/19/2023               Plan     Continued with current POC      Dayron Fuentes, PT, DPT  5/19/2023

## 2023-05-26 ENCOUNTER — CLINICAL SUPPORT (OUTPATIENT)
Dept: REHABILITATION | Facility: HOSPITAL | Age: 28
End: 2023-05-26
Attending: STUDENT IN AN ORGANIZED HEALTH CARE EDUCATION/TRAINING PROGRAM
Payer: COMMERCIAL

## 2023-05-26 DIAGNOSIS — M25.511 CHRONIC RIGHT SHOULDER PAIN: Primary | ICD-10-CM

## 2023-05-26 DIAGNOSIS — G89.29 CHRONIC RIGHT SHOULDER PAIN: Primary | ICD-10-CM

## 2023-05-26 PROCEDURE — 97112 NEUROMUSCULAR REEDUCATION: CPT | Mod: PN

## 2023-05-26 PROCEDURE — 97110 THERAPEUTIC EXERCISES: CPT | Mod: PN

## 2023-05-26 NOTE — PROGRESS NOTES
Physical Therapy Daily Treatment Note         Name: Alexis Milligan  Clinic Number: [id number]    Therapy Diagnosis:   Name Primary?    Chronic right shoulder pain      Physician: Windy Ordoñez MD    Visit Date: 2023    Physician Orders: PT Eval and Treat   Medical Diagnosis from Referral: M25.511,G89.29 (ICD-10-CM) - Chronic right shoulder pain  Evaluation Date: 3/31/2023  Authorization Period Expiration: 2024  Plan of Care Expiration: 2023   Progress Note Due: 2023  Visit # / Visits authorized:    Remaining Visits Scheduled -   PTA Consecutive Visits -     Eval FOTO - 64 (3/31/2023)  6th Visit FOTO - 70 (2023)  10th Visit FOTO -  (Date, Score/ turn green )  D/C FOTO - (Date, Score/ turn green )    Time In: 11:00 AM   Time Out: 11:45 PM  Billable Time: 45 minutes (1TE, 2NM)  Non-Billable Time: 00 minutes    Precautions: Standard and HTN  Insurance: Payor: [employer] / Plan: [employer] BASIC / Product Type: PPO /     Subjective     Pt reports: soreness has decreased since last visit and pain has subsided.  She is compliant with home exercise program.  Response to previous treatment:     Pre-Treatment Pain Ratin/10  Post-Treatment Pain Ratin/10  Location: right shoulder      Objective             SHOULDER ACTIVE RANGE OF MOTION     Left Right    Flexion Within Normal Limits  Within Normal Limits    Abduction Within Normal Limits  WFL   External Rotation 100° 90 some tightness   Internal Rotation (Apley Scratch Test) T5 T5, uncomfortable   Extension 80° 80° no pain           UPPER LOWER EXTREMITY STRENGTH     Left  Right    Shoulder Flexion 4/5 4/5   Shoulder Abduction 4+/5 4/5   Shoulder External Rotation 4+/5 4/5*   Shoulder Internal Rotation 4+/5 4+/5   Elbow Flexion 5/5 5/5   Elbow Extension 5/5 5/5   *denotes discomfort          PRONE SCAPULAR STRENGTH     Left Right    Rhomboids (Thumbs Up) 4-/5 4-/5   Mid Trapezius (Thumbs Down) 4-/5 4-/5       Nabeel  received therapeutic exercises to develop strength, endurance, ROM, and flexibility for 20 minutes including:    Reassessment    Levator scap 3x30s  Upper Trap 3x30s  Rows BTB 3x10   Supine Flexion RTB 2x10  Sidelying ER #1 3x10  Sidelying Abduction RTB 2x10  Shoulder extension BTB 2x10  Serratus punches 3x10 (fast on the way up, slow on the way down) 6lb Dbs  Bent over rows with thoracic rotation 4lb DB 3x10  BTB Resisted ER/IR 3x10   Standing shoulder scaption/abduction  #2 3x10      *Bold exercise performed     Nabeel participated in neuromuscular re-education activities to improve: Balance, Coordination, and Kinesthetic for 25 minutes. The following activities were included:  Standing Isometric Shoulder Internal Rotation at Doorway  2x10 - 3 hold  Standing Isometric Shoulder External Rotation with Doorway  2x10 reps - 3 hold  Standing Isometric Shoulder Flexion with Doorway - Arm Bent 2x10 reps - 3 hold  Standing Isometric Shoulder Abduction with Doorway - Arm Bent  2 sets - 10 reps - 3 hold  Standing Wall Ball Circles with Mini Swiss Ball  3 sets - 8 reps - 3 hold  Crawling on table w/o RB 10 laps forward/backward  Prone T's 3x10 w/ER cue  Prone I's 3x10 w/ER cue  Prone face pull w/rotation 2x10  Bird dogs 2x10 B sides  Scapular retractions 3x10  Truckers pose YTB 3x8      Nabeel received the following manual therapy techniques: Joint mobilizations were applied to the: R shoulder for 00 minutes, including:    Visit Number:  1 out of 20   Manual Therapy  (amplitude and time)     GH joint mobilization A/P and S/I NP   Scapular mobilization in L sidelying NP   STM of periscapular muscles including cross friction massage of rotator cuff musculature NP             Nabeel participated in dynamic functional therapeutic activities to improve functional performance for 00  minutes, including:    Visit Number:  4 out of 20   Activities performed   (duration/resistance)     Overhead reaching Blue weight NP                  "       Home Exercises Provided and Patient Education Provided     Education provided:   - Continue with HEP    Written Home Exercises Provided: Patient instructed to cont prior HEP.  Exercises were reviewed and Nabeel was able to demonstrate them prior to the end of the session.  Nabeel demonstrated good  understanding of the education provided.     See EMR under Patient Instructions for exercises provided prior visit.    Assessment     Pt tolerated treatment session and reassessment well. No soreness or pain has been noted by patient per subjective report. Patient demonstrated improvement in strength, range of motion, and stability. Patient complete all goals but noted that she would like to continue for "a couple more treatment sessions" before discharging. Patient tolerated treatment session well and reported no increase in pain. Continue with current treatment plan.    Nabeel Is progressing well towards Her goals.     Pt prognosis is Good.     Pt will continue to benefit from skilled outpatient physical therapy to address the deficits listed in the problem list box on initial evaluation, provide pt/family education and to maximize pt's level of independence in the home and community environment.     Pt's spiritual, cultural and educational needs considered and pt agreeable to plan of care and goals.    Anticipated barriers to physical therapy: none    Goals:  Short Term Goals: 4 weeks   Patient will be independent with HEP at home to increase PT compliance.  Goal met     Patient will be independent with driving for 30 min with < 3/10 pain to increase functional capacity   Goal met      Patient will be independent with increasing Right shoulder active range of motion to full mobility.   Goal met 5/26/2023              Long Term Goals: 8 weeks   Patient will decrease DASH score to < 10% to increase functional capacity.  Progressing 5/26/2023      Patient will be independent with going to the gym for 1 week with < " 2/10 pain to increase recreational capacity  Goal met      Patient will be independent with working for 1 week with < 2/10 pain to increase occupational capacity  Goal met 5/26/2023               Plan   3/31/2023 - 6/26/2023    Continued with current POC      Dayron Fuentes, PT, DPT  5/26/2023

## 2023-06-01 ENCOUNTER — DOCUMENTATION ONLY (OUTPATIENT)
Dept: REHABILITATION | Facility: HOSPITAL | Age: 28
End: 2023-06-01
Payer: COMMERCIAL

## 2023-06-09 ENCOUNTER — CLINICAL SUPPORT (OUTPATIENT)
Dept: REHABILITATION | Facility: HOSPITAL | Age: 28
End: 2023-06-09
Payer: COMMERCIAL

## 2023-06-09 DIAGNOSIS — M25.511 CHRONIC RIGHT SHOULDER PAIN: Primary | ICD-10-CM

## 2023-06-09 DIAGNOSIS — G89.29 CHRONIC RIGHT SHOULDER PAIN: Primary | ICD-10-CM

## 2023-06-09 PROCEDURE — 97110 THERAPEUTIC EXERCISES: CPT | Mod: PN

## 2023-06-09 PROCEDURE — 97112 NEUROMUSCULAR REEDUCATION: CPT | Mod: PN

## 2023-06-09 NOTE — PROGRESS NOTES
"Physical Therapy Daily Treatment Note         Name: Nabeel Braunigan  Clinic Number: [id number]    Therapy Diagnosis:   Name Primary?    Chronic right shoulder pain      Physician: Windy Ordoñez MD    Visit Date: 2023    Physician Orders: PT Eval and Treat   Medical Diagnosis from Referral: M25.511,G89.29 (ICD-10-CM) - Chronic right shoulder pain  Evaluation Date: 3/31/2023  Authorization Period Expiration: 2024  Plan of Care Expiration: 2023   Progress Note Due: 2023  Visit # / Visits authorized:    Remaining Visits Scheduled -   PTA Consecutive Visits - 2/    Eval FOTO - 64 (3/31/2023)  6th Visit FOTO - 70 (2023)  10th Visit FOTO -  (Date, Score/ turn green )  D/C FOTO - (Date, Score/ turn green )    Time In: 1020 AM   Time Out: 1100 PM  Billable Time: 40 minutes (1TE, 2NM)  Non-Billable Time: 00 minutes    Precautions: Standard and HTN  Insurance: Payor: [employer] / Plan: [employer] BASIC / Product Type: PPO /     Subjective     Pt reports: shoulder pain is a "tad achy but ignorable"   She is compliant with home exercise program.  Response to previous treatment:     Pre-Treatment Pain Ratin/10  Post-Treatment Pain Ratin/10  Location: right shoulder      Objective        Nabeel received therapeutic exercises to develop strength, endurance, ROM, and flexibility for 20 minutes including:    Reassessment    Levator scap 3x30s  Upper Trap 3x30s  Rows BTB 3x10   Supine Flexion RTB 2x10  Sidelying ER #1 3x10  Sidelying Abduction RTB 2x10  Shoulder extension BTB 2x10  Serratus punches 3x10 (fast on the way up, slow on the way down) 6lb Dbs  Bent over rows with thoracic rotation 4lb DB 3x10  BTB Resisted ER/IR 3x10   Standing shoulder scaption/abduction  #2 3x10      *Bold exercise performed     Nabeel participated in neuromuscular re-education activities to improve: Balance, Coordination, and Kinesthetic for 25 minutes. The following activities were included:  Standing " Isometric Shoulder Internal Rotation at Doorway  2x10 - 3 hold  Standing Isometric Shoulder External Rotation with Doorway  2x10 reps - 3 hold  Standing Isometric Shoulder Flexion with Doorway - Arm Bent 2x10 reps - 3 hold  Standing Isometric Shoulder Abduction with Doorway - Arm Bent  2 sets - 10 reps - 3 hold  Standing Wall Ball Circles with Mini Swiss Ball  3 sets - 8 reps - 3 hold  Crawling on table w/o RB 10 laps forward/backward  Prone T's 3x10 w/ER cue  Prone I's 3x10 w/ER cue  Prone face pull w/rotation 2x10  Bird dogs 2x10 B sides  Scapular retractions 3x10  Truckers pose YTB 3x8      Nabeel received the following manual therapy techniques: Joint mobilizations were applied to the: R shoulder for 00 minutes, including:    Visit Number:  1 out of 20   Manual Therapy  (amplitude and time)     GH joint mobilization A/P and S/I NP   Scapular mobilization in L sidelying NP   STM of periscapular muscles including cross friction massage of rotator cuff musculature NP             Nabeel participated in dynamic functional therapeutic activities to improve functional performance for 00  minutes, including:    Visit Number:  4 out of 20   Activities performed   (duration/resistance)     Overhead reaching Blue weight NP                        Home Exercises Provided and Patient Education Provided     Education provided:   - Continue with HEP    Written Home Exercises Provided: Patient instructed to cont prior HEP.  Exercises were reviewed and Nabeel was able to demonstrate them prior to the end of the session.  Nabeel demonstrated good  understanding of the education provided.     See EMR under Patient Instructions for exercises provided prior visit.    Assessment       Patient currently presents to therapy with reports of continued right shoulder soreness with functional activity. Patient continues to demonstrate improvement in strength, range of motion, and stability. Patient continues to progress towards meeting  goals. Patient will benefit from skilled therapy to address current deficits.     Nabeel Is progressing well towards Her goals.     Pt prognosis is Good.     Pt will continue to benefit from skilled outpatient physical therapy to address the deficits listed in the problem list box on initial evaluation, provide pt/family education and to maximize pt's level of independence in the home and community environment.     Pt's spiritual, cultural and educational needs considered and pt agreeable to plan of care and goals.    Anticipated barriers to physical therapy: none    Goals:  Short Term Goals: 4 weeks   Patient will be independent with HEP at home to increase PT compliance.  Goal met     Patient will be independent with driving for 30 min with < 3/10 pain to increase functional capacity   Goal met      Patient will be independent with increasing Right shoulder active range of motion to full mobility.   Goal met 5/26/2023              Long Term Goals: 8 weeks   Patient will decrease DASH score to < 10% to increase functional capacity.  Progressing 6/9/2023      Patient will be independent with going to the gym for 1 week with < 2/10 pain to increase recreational capacity  Goal met      Patient will be independent with working for 1 week with < 2/10 pain to increase occupational capacity  Goal met 5/26/2023               Plan   3/31/2023 - 6/26/2023    Continued with current POC      Nik Sorenson, PT, DPT  6/9/2023

## 2023-06-23 ENCOUNTER — CLINICAL SUPPORT (OUTPATIENT)
Dept: REHABILITATION | Facility: HOSPITAL | Age: 28
End: 2023-06-23
Payer: COMMERCIAL

## 2023-06-23 DIAGNOSIS — M25.511 CHRONIC RIGHT SHOULDER PAIN: Primary | ICD-10-CM

## 2023-06-23 DIAGNOSIS — G89.29 CHRONIC RIGHT SHOULDER PAIN: Primary | ICD-10-CM

## 2023-06-23 PROCEDURE — 97112 NEUROMUSCULAR REEDUCATION: CPT | Mod: PN

## 2023-06-23 PROCEDURE — 97140 MANUAL THERAPY 1/> REGIONS: CPT | Mod: PN

## 2023-06-23 PROCEDURE — 97110 THERAPEUTIC EXERCISES: CPT | Mod: PN

## 2023-06-23 NOTE — PROGRESS NOTES
Physical Therapy Daily Treatment Note         [id number]    Therapy Diagnosis:   Name Primary?    Chronic right shoulder pain      Physician: Windy Ordoñez MD    Visit Date: 2023    Physician Orders: PT Eval and Treat   Medical Diagnosis from Referral: M25.511,G89.29 (ICD-10-CM) - Chronic right shoulder pain  Evaluation Date: 3/31/2023  Authorization Period Expiration: 2024  Plan of Care Expiration: 2023   Progress Note Due: 2023  Visit # / Visits authorized:    Remaining Visits Scheduled -   PTA Consecutive Visits - 0/6    Eval FOTO - 64 (3/31/2023)  6th Visit FOTO - 70 (2023)  10th Visit FOTO -  (Date, Score/ turn green )  D/C FOTO - (Date, Score/ turn green )    Time In: 10:28 AM   Time Out: 11:00 AM  Billable Time: 32 minutes (1TE, 1NM, 1MT)  Non-Billable Time: 00 minutes    Precautions: Standard and HTN  Insurance: Payor: [employer] / Plan: [employer]AL BASIC / Product Type: PPO /     Subjective     Pt reports: residual pain after her workouts. Her pull-ups have been really difficult but has no pain outside of gym activity.  She is compliant with home exercise program.  Response to previous treatment:     Pre-Treatment Pain Ratin/10  Post-Treatment Pain Ratin/10  Location: right shoulder      Objective        Nabeel received therapeutic exercises to develop strength, endurance, ROM, and flexibility for 12 minutes including:    Levator scap 3x30s  Upper Trap stretch 10x10s  Rows BTB 3x10   Supine Flexion RTB 2x10  Sidelying ER #1 3x10  Sidelying Abduction RTB 2x10  Shoulder extension BTB 2x10  Push up plus 3x10  Serratus punches 3x10 (fast on the way up, slow on the way down) 6lb Dbs  Bent over rows with thoracic rotation 4lb DB 3x10  BTB Resisted ER/IR 3x10   Standing shoulder scaption/abduction  #2 3x10      *Bold exercise performed     Nabeel participated in neuromuscular re-education activities to improve: Balance,  Coordination, and Kinesthetic for 12 minutes. The following activities were included:  Standing Isometric Shoulder Internal Rotation at Doorway  2x10 - 3 hold  Standing Isometric Shoulder External Rotation with Doorway  2x10 reps - 3 hold  Standing Isometric Shoulder Flexion with Doorway - Arm Bent 2x10 reps - 3 hold  Standing Isometric Shoulder Abduction with Doorway - Arm Bent  2 sets - 10 reps - 3 hold  Standing Wall Ball Circles with Mini Swiss Ball  3 sets - 8 reps - 3 hold  Crawling on table w/o RB 10 laps forward/backward  Prone T's 3x10 w/ER cue  Prone I's 3x10 w/6lbs  Stirring the pot 2x10  Standing face pulls with ER 3x10   Prone face pull w/rotation 2x10  Bird dogs 2x10 B sides  Scapular retractions 3x10  Truckers pose YTB 3x8      Nabeel received the following manual therapy techniques: Joint mobilizations were applied to the: R shoulder for 08 minutes, including:    Visit Number:  1 out of 20   Manual Therapy  (amplitude and time)     GH joint mobilization A/P and S/I NP   Scapular mobilization in L sidelying Performed   STM of periscapular muscles including cross friction massage of rotator cuff musculature Performed              Nabeel participated in dynamic functional therapeutic activities to improve functional performance for 00  minutes, including:    Visit Number:  4 out of 20   Activities performed   (duration/resistance)     Overhead reaching Blue weight NP                        Home Exercises Provided and Patient Education Provided     Education provided:   - Continue with HEP    Written Home Exercises Provided: Patient instructed to cont prior HEP.  Exercises were reviewed and Nabeel was able to demonstrate them prior to the end of the session.  Nabeel demonstrated good  understanding of the education provided.     See EMR under Patient Instructions for exercises provided prior visit.    Assessment     Patient presents to treatment session with continued pain in R shoulder. Patient is  improving in symptoms but continues to note pain with exercises in the gym. Patient was educated on scapulohumeral rhythm and pain relief strategies to improve function of R shoulder. Continuation of shoulder stabilization exercises was provided with no increase in pain symptoms. Continue with current POC.    Nabeel Is progressing well towards Her goals.     Pt prognosis is Good.     Pt will continue to benefit from skilled outpatient physical therapy to address the deficits listed in the problem list box on initial evaluation, provide pt/family education and to maximize pt's level of independence in the home and community environment.     Pt's spiritual, cultural and educational needs considered and pt agreeable to plan of care and goals.    Anticipated barriers to physical therapy: none    Goals:  Short Term Goals: 4 weeks   Patient will be independent with HEP at home to increase PT compliance.  Goal met     Patient will be independent with driving for 30 min with < 3/10 pain to increase functional capacity   Goal met      Patient will be independent with increasing Right shoulder active range of motion to full mobility.   Goal met 5/26/2023              Long Term Goals: 8 weeks   Patient will decrease DASH score to < 10% to increase functional capacity.  Progressing 6/23/2023      Patient will be independent with going to the gym for 1 week with < 2/10 pain to increase recreational capacity  Goal met      Patient will be independent with working for 1 week with < 2/10 pain to increase occupational capacity  Goal met 5/26/2023               Plan   3/31/2023 - 6/26/2023    Continued with current POC      Dayron Fuentes, PT, DPT  6/23/2023

## 2023-06-27 NOTE — PROGRESS NOTES
Physical Therapy Daily Treatment Note/Progress Note     Name: Alexis Milligan  Clinic Number: [id number]    Therapy Diagnosis:   Name Primary?    Chronic right shoulder pain      Physician: Windy Ordoñez MD    Visit Date: 2023    Physician Orders: PT Eval and Treat   Medical Diagnosis from Referral: M25.511,G89.29 (ICD-10-CM) - Chronic right shoulder pain  Evaluation Date: 3/31/2023  Authorization Period Expiration: 2024  Plan of Care Expiration: 2023  Progress Note Due: 2023, performed 2023  Visit # / Visits authorized: 10/ 20   Remaining Visits Scheduled -   PTA Consecutive Visits - 0/6    Eval FOTO - 64 (3/31/2023)  6th Visit FOTO - 70 (2023)  10th Visit FOTO -  (Date, Score/ turn green )  D/C FOTO - (Date, Score/ turn green )    Time In: 11:15 AM   Time Out: 11:53 AM  Billable Time: 38 minutes   Non-Billable Time: 00 minutes    Precautions: Standard and HTN  Insurance: Payor: [employer] / Plan: [employer] BASIC / Product Type: PPO /     Subjective     Pt reports: that it is fine, reports feeling okay at rest and feels fatigue w/ SH strengthening. States she is able to perform increased weight w/ R SH at gym.   She is compliant with home exercise program 4-5x.  Response to previous treatment: feels the same as last visit but noted increased strength.    Pre-Treatment Pain Ratin/10  Post-Treatment Pain Ratin/10  Location: right shoulder      Objective     Performed Re-assessment for 04 mins:     POSTURE:  Patient has forward head posture, and R rounded and internally rotated shoulders          SHOULDER ACTIVE RANGE OF MOTION     Left Right    Flexion Within Normal Limits  Within Normal Limits    Abduction Within Normal Limits  Within Normal Limits   External Rotation Within Normal Limits Within Normal Limits   Internal Rotation (Apley Scratch Test) Within Normal Limits Within Normal Limits   Extension Within Normal Limits Within Normal Limits           UPPER  LOWER EXTREMITY STRENGTH     Left  Right    Shoulder Flexion 4+/5 4+/5   Shoulder Abduction 4+/5 4/5 + pain   Shoulder External Rotation 4+/5 4+/5   Shoulder Internal Rotation 4+/5 4+/5   Elbow Flexion 5/5 5/5   Elbow Extension 5/5 5/5           PRONE SCAPULAR STRENGTH     Left Right    Upper Trapezius 4+/5 4+/5   Mid Trapezius  4+/5 4+/5         PALPATION:  Patient reports primary pain region along the lateral aspect of the Right shoulder.        Nabeel received therapeutic exercises to develop strength, endurance, ROM, and flexibility for 24 minutes including:    *Bold exercise performed today.    Levator scap 3x30s  Upper Trap stretch 10x10s  Rows BTB 3x10   Supine Flexion RTB 2x10  Sidelying ER #1 3x10  Sidelying Abduction RTB 2x10  Shoulder extension BTB 2x10  Push up plus 3x10  Serratus punches 3x10 (fast on the way up, slow on the way down) 6lb Dbs  Bent over rows with thoracic rotation 4lb DB 3x10  Standing Rows w/ B TB 20x  Standing Pull Downs w/ BTB 20x  SH ABD w/ 3 sec iso hold w/ blue band  B SH ER at 90 deg 20x   BTB Resisted ER/IR 3x10   Standing shoulder scaption/abduction  #2 3x10  Serratus punches w/ #8lb 20x w/ 3 sec iso hold      Nabeel participated in neuromuscular re-education activities to improve: Balance, Coordination, and Kinesthetic for 10 minutes. The following activities were included:  Standing Isometric Shoulder Internal Rotation at Doorway  2x10 - 3 hold  Standing Isometric Shoulder External Rotation with Doorway  2x10 reps - 3 hold  Standing Isometric Shoulder Flexion with Doorway - Arm Bent 2x10 reps - 3 hold  Standing Isometric Shoulder Abduction with Doorway - Arm Bent  2 sets - 10 reps - 3 hold  Standing Wall Ball Circles with Mini Swiss Ball  3 sets - 8 reps - 3 hold  Crawling on table w/o RB 10 laps forward/backward  Prone T's 20x ea w/ 6lbs VC/TC for form  Prone I's 20x ea w/6lbs VC/TC for form  Prone Y's ea w/ #4lbs w/ VC/TC for form  Stirring the pot 2x10  Standing face pulls  with ER 3x10   Prone face pull w/rotation 2x10  Bird dogs 2x10 B sides  Scapular retractions 3x10  Truckers pose YTB 3x8      Nabeel received the following manual therapy techniques: Joint mobilizations were applied to the: R shoulder for 00 minutes, including:    Visit Number:  1 out of 20   Manual Therapy  (amplitude and time)     GH joint mobilization A/P and S/I NP   Scapular mobilization in L sidelying NP   STM of periscapular muscles including cross friction massage of rotator cuff musculature NP             Nabeel participated in dynamic functional therapeutic activities to improve functional performance for 00  minutes, including:    Visit Number:  4 out of 20   Activities performed   (duration/resistance)     Overhead reaching Blue weight NP                        Home Exercises Provided and Patient Education Provided     Education provided:   - Prescribed new exercise program and instructed to continue w/ previous HEP as well.     Written Home Exercises Provided: Patient instructed to cont prior HEP.  Exercises were reviewed and Nabeel was able to demonstrate them prior to the end of the session.  Nabeel demonstrated good  understanding of the education provided.     See EMR under Patient Instructions for exercises provided prior visit.    Assessment     Performed re-assessment/progress note today for possible D/C next session. Pt continues to be hypermobile w/ B SH AROM WNL in all planes and strength is globally 4+/5 for SH and elbow in all planes. Pt noted some continued weakness and pain w/ R SH ABD but stated she feels comfortable w/ next session being a D/C and managing this ind at home. Patient is improving in symptoms and overall strength but continues to note pain/increased soreness with certain exercises in the gym. Tolerated performing IYT's w/ increased weights today w/ VC and TC for positioning. Patient was educated on performing rows w/ BTB, pull downs w/ BTB, and SH ER at 90 deg w/ BTB w/o  adverse effects and was prescribed these to complete at home to continue to progress her functional strength/endurance. Continuation of shoulder stabilization exercises was provided with no increase in pain symptoms. Continue with current POC.    Nabeel Is progressing well towards Her goals.     Pt prognosis is Good.     Pt will continue to benefit from skilled outpatient physical therapy to address the deficits listed in the problem list box on initial evaluation, provide pt/family education and to maximize pt's level of independence in the home and community environment.     Pt's spiritual, cultural and educational needs considered and pt agreeable to plan of care and goals.    Anticipated barriers to physical therapy: none    Goals:  Short Term Goals: 4 weeks   Patient will be independent with HEP at home to increase PT compliance.  Goal met     Patient will be independent with driving for 30 min with < 3/10 pain to increase functional capacity   Goal met      Patient will be independent with increasing Right shoulder active range of motion to full mobility.   Goal met 5/26/2023              Long Term Goals: 8 weeks   Patient will decrease DASH score to < 10% to increase functional capacity.  Progressing 7/7/2023      Patient will be independent with going to the gym for 1 week with < 2/10 pain to increase recreational capacity  Goal met 07/07/2023      Patient will be independent with working for 1 week with < 2/10 pain to increase occupational capacity  Goal met 5/26/2023               Plan   3/31/2023 - 07/16/2023    Continued with current POC      Rosario Corbett, PT, DPT  7/7/2023

## 2023-07-07 ENCOUNTER — CLINICAL SUPPORT (OUTPATIENT)
Dept: REHABILITATION | Facility: HOSPITAL | Age: 28
End: 2023-07-07
Payer: COMMERCIAL

## 2023-07-07 DIAGNOSIS — G89.29 CHRONIC RIGHT SHOULDER PAIN: Primary | ICD-10-CM

## 2023-07-07 DIAGNOSIS — M25.511 CHRONIC RIGHT SHOULDER PAIN: Primary | ICD-10-CM

## 2023-07-07 PROCEDURE — 97112 NEUROMUSCULAR REEDUCATION: CPT | Mod: PN

## 2023-07-07 PROCEDURE — 97110 THERAPEUTIC EXERCISES: CPT | Mod: PN

## 2023-07-21 ENCOUNTER — CLINICAL SUPPORT (OUTPATIENT)
Dept: REHABILITATION | Facility: HOSPITAL | Age: 28
End: 2023-07-21
Payer: COMMERCIAL

## 2023-07-21 DIAGNOSIS — M25.511 CHRONIC RIGHT SHOULDER PAIN: Primary | ICD-10-CM

## 2023-07-21 DIAGNOSIS — G89.29 CHRONIC RIGHT SHOULDER PAIN: Primary | ICD-10-CM

## 2023-07-21 PROCEDURE — 97110 THERAPEUTIC EXERCISES: CPT | Mod: PO

## 2023-07-21 NOTE — PROGRESS NOTES
Physical Therapy Discharge Note     Name: Alexis Milligan  [facility] Number: [id number]    Therapy Diagnosis:   Name Primary?    Chronic right shoulder pain      Physician: No ref. provider found    Visit Date: 2023    Physician Orders: PT Eval and Treat   Medical Diagnosis from Referral: M25.511,G89.29 (ICD-10-CM) - Chronic right shoulder pain  Evaluation Date: 3/31/2023  Authorization Period Expiration: 2024  Plan of Care Expiration: 2023  Progress Note Due: 2023, performed 2023  Visit # / Visits authorized: 10/ 20   Remaining Visits Scheduled - 0  PTA Consecutive Visits - 0/6    Eval FOTO - 64 (3/31/2023)  6th Visit FOTO - 70 (2023)  D/C FOTO - 2023/     Time In: 10:15 AM   Time Out: 11:00 AM  Total Time: 45 mins  Billable Time: 30 minutes   Non-Billable Time: 15 minutes, co-treated    Precautions: Standard and HTN  Insurance: Payor: [employer] / Plan: [employer] BASIC / Product Type: PPO /     Subjective     Pt reports: that she feels good and is about to go on vacation and is ready to be D/C'd from PT.  She is compliant with home exercise program.  Response to previous treatment: feels the same as last visit but noted increased strength.    Pre-Treatment Pain Ratin/10  Post-Treatment Pain Ratin/10  Location: right shoulder      Objective             SHOULDER ACTIVE RANGE OF MOTION     Left Right    Flexion Within Normal Limits  Within Normal Limits    Abduction Within Normal Limits  Within Normal Limits   External Rotation Within Normal Limits Within Normal Limits   Internal Rotation (Apley Scratch Test) Within Normal Limits Within Normal Limits   Extension Within Normal Limits Within Normal Limits              UPPER LOWER EXTREMITY STRENGTH     Left  Right    Shoulder Flexion 5/5 5/5   Shoulder Abduction 5/5 5/5   Shoulder External Rotation 5/5 5/5   Shoulder Internal Rotation 5/5 5/5   Elbow Flexion 5/5 5/5   Elbow Extension 5/5 5/5      Treatment      Nabeel received therapeutic exercises to develop strength, endurance, ROM, and flexibility for 40 minutes including:    *Bold exercise performed today.    Levator scap 3x30s  Upper Trap stretch 10x10s  Rows BTB 3x10   Supine Flexion RTB 2x10  Sidelying ER #1 3x10  Sidelying Abduction RTB 2x10  Shoulder extension BTB 2x10  Push up plus 3x10  Serratus punches 3x10 (fast on the way up, slow on the way down) 6lb Dbs  Bent over rows with thoracic rotation 4lb DB 3x10  Standing Rows w/ Black TB 20x  Standing Pull Downs w/ Black TB 20x  SH ABD w/ 3 sec iso hold w/ blue band  SH ER at 90 deg 2x10 w/ GTB   B SH ER at 90 deg w/ BTB 2x 10ea  BTB Resisted ER/IR 3x10   Standing shoulder scaption/abduction  #2 3x10  Serratus punches w/ #8lb 20x w/ 3 sec iso hold  Theraball Plank 2x 30 sec ea  Therball Plank Arm Step up 2x 30 sec ea  Dead Bugs w/ theraball 2x15 ea  Theraball roll ins for core stability w/ SH's scaption 2x15ea        Nabeel participated in neuromuscular re-education activities to improve: Balance, Coordination, and Kinesthetic for 05 minutes. The following activities were included:  Standing Isometric Shoulder Internal Rotation at Doorway  2x10 - 3 hold  Standing Isometric Shoulder External Rotation with Doorway  2x10 reps - 3 hold  Standing Isometric Shoulder Flexion with Doorway - Arm Bent 2x10 reps - 3 hold  Standing Isometric Shoulder Abduction with Doorway - Arm Bent  2 sets - 10 reps - 3 hold  Standing Wall Ball Circles with Mini Swiss Ball  3 sets - 8 reps - 3 hold  Crawling on table w/o RB 10 laps forward/backward  Prone T's 20x ea w/ 6lbs VC/TC for form  Prone I's 20x ea w/6lbs VC/TC for form  Prone Y's ea w/ #4lbs w/ VC/TC for form  Stirring the pot 2x10  Standing face pulls with ER 3x10   Prone face pull w/rotation 2x10  Bird dogs 2x10 B sides  Scapular retractions 3x10  Truckers pose YTB 3x8  Single Leg Balance w/ #2lb ball throw onto trampoline for SH coordination/proprioception 5'      Nabeel  received the following manual therapy techniques: Joint mobilizations were applied to the: R shoulder for 00 minutes, including:    Visit Number:  1 out of 20   Manual Therapy  (amplitude and time)     GH joint mobilization A/P and S/I NP   Scapular mobilization in L sidelying NP   STM of periscapular muscles including cross friction massage of rotator cuff musculature NP             Nabeel participated in dynamic functional therapeutic activities to improve functional performance for 00  minutes, including:    Visit Number:  4 out of 20   Activities performed   (duration/resistance)     Overhead reaching Blue weight NP                        Home Exercises Provided and Patient Education Provided     Education provided:   - Continue w/ HEP and use black theraband for HEP.    Written Home Exercises Provided: Patient instructed to cont prior HEP.  Exercises were reviewed and Nabeel was able to demonstrate them prior to the end of the session.  Nabeel demonstrated good  understanding of the education provided.     See EMR under Patient Instructions for exercises provided prior visit.    Assessment     Performed D/C'd today. Pt continues to be hypermobile w/ B SH AROM WFL in all planes and strength is globally 5/5 for B SH and B elbow in all planes. Pt states she has no pain and feels comfortable being D/C'd and managing this ind at home. Patient responded well to increased reps and new exercises including plank progressions, SH throwing coordination, and dead bugs w/ theraball for increased SH activation/stability w/o adverse effects or pain. Continuation of shoulder stabilization exercises was provided with no increase in pain symptoms. Patient was educated to contact us if her SH begins to hurt again or if she has any issues w/ HEP.    Nabeel Is progressing well towards Her goals.     Pt prognosis is Good.     Pt will continue to benefit from skilled outpatient physical therapy to address the deficits listed in the  problem list box on initial evaluation, provide pt/family education and to maximize pt's level of independence in the home and community environment.     Pt's spiritual, cultural and educational needs considered and pt agreeable to plan of care and goals.    Anticipated barriers to physical therapy: none    Goals:  Short Term Goals: 4 weeks   Patient will be independent with HEP at home to increase PT compliance.  Goal met     Patient will be independent with driving for 30 min with < 3/10 pain to increase functional capacity   Goal met      Patient will be independent with increasing Right shoulder active range of motion to full mobility.   Goal met 5/26/2023              Long Term Goals: 8 weeks   Patient will decrease DASH score to < 10% to increase functional capacity.  Progressing 7/21/2023      Patient will be independent with going to the gym for 1 week with < 2/10 pain to increase recreational capacity  Goal met 07/07/2023      Patient will be independent with working for 1 week with < 2/10 pain to increase occupational capacity  Goal met 5/26/2023               Plan   3/31/2023 - 07/16/2023    Continued with current POC      Rosario Corbett, PT, DPT  7/21/2023

## 2023-08-16 DIAGNOSIS — I10 HYPERTENSION, UNSPECIFIED TYPE: ICD-10-CM

## 2023-08-16 NOTE — TELEPHONE ENCOUNTER
Care Due:                  Date            Visit Type   Department     Provider  --------------------------------------------------------------------------------                                EP -                              PRIMARY      [facility] PRIMARY  Last Visit: 02-      CARE ([facility])   BECKIE Ordoñez  Next Visit: None Scheduled  None         None Found                                                            Last  Test          Frequency    Reason                     Performed    Due Date  --------------------------------------------------------------------------------    CMP.........  12 months..  hydroCHLOROthiazide......  08- 08-    [facility] Embedded Care Due Messages. Reference number: [id number].   8/16/2023 8:53:32 AM CDT

## 2023-08-17 RX ORDER — HYDROCHLOROTHIAZIDE 12.5 MG/1
12.5 TABLET ORAL DAILY
Qty: 30 TABLET | Refills: 1 | Status: SHIPPED | OUTPATIENT
Start: 2023-08-17

## 2023-11-17 ENCOUNTER — PATIENT MESSAGE (OUTPATIENT)
Dept: ADMINISTRATIVE | Facility: HOSPITAL | Age: 28
End: 2023-11-17

## 2024-04-03 ENCOUNTER — PATIENT MESSAGE (OUTPATIENT)
Dept: ADMINISTRATIVE | Facility: HOSPITAL | Age: 29
End: 2024-04-03

## 2024-07-09 ENCOUNTER — PATIENT MESSAGE (OUTPATIENT)
Dept: ADMINISTRATIVE | Facility: HOSPITAL | Age: 29
End: 2024-07-09

## 2024-10-16 ENCOUNTER — PATIENT MESSAGE (OUTPATIENT)
Dept: ADMINISTRATIVE | Facility: HOSPITAL | Age: 29
End: 2024-10-16

## 2024-11-13 ENCOUNTER — PATIENT MESSAGE (OUTPATIENT)
Dept: ADMINISTRATIVE | Facility: HOSPITAL | Age: 29
End: 2024-11-13

## 2024-11-21 ENCOUNTER — PATIENT MESSAGE (OUTPATIENT)
Dept: ADMINISTRATIVE | Facility: HOSPITAL | Age: 29
End: 2024-11-21

## 2025-01-08 ENCOUNTER — PATIENT MESSAGE (OUTPATIENT)
Dept: ADMINISTRATIVE | Facility: HOSPITAL | Age: 30
End: 2025-01-08

## 2025-02-12 ENCOUNTER — TELEPHONE (OUTPATIENT)
Dept: PRIMARY CARE CLINIC | Facility: CLINIC | Age: 30
End: 2025-02-12

## 2025-03-14 ENCOUNTER — LAB VISIT (OUTPATIENT)
Dept: PRIMARY CARE CLINIC | Facility: CLINIC | Age: 30
End: 2025-03-14

## 2025-03-14 ENCOUNTER — OFFICE VISIT (OUTPATIENT)
Dept: PRIMARY CARE CLINIC | Facility: CLINIC | Age: 30
End: 2025-03-14

## 2025-03-14 VITALS
HEIGHT: 62 IN | WEIGHT: 153.44 LBS | TEMPERATURE: 98 F | OXYGEN SATURATION: 99 % | HEART RATE: 51 BPM | BODY MASS INDEX: 28.24 KG/M2 | SYSTOLIC BLOOD PRESSURE: 171 MMHG | DIASTOLIC BLOOD PRESSURE: 99 MMHG

## 2025-03-14 DIAGNOSIS — I10 UNCONTROLLED HYPERTENSION: Primary | ICD-10-CM

## 2025-03-14 DIAGNOSIS — R00.1 BRADYCARDIA: ICD-10-CM

## 2025-03-14 DIAGNOSIS — I10 HYPERTENSION, UNSPECIFIED TYPE: ICD-10-CM

## 2025-03-14 DIAGNOSIS — F41.9 ANXIETY AND DEPRESSION: ICD-10-CM

## 2025-03-14 DIAGNOSIS — I10 UNCONTROLLED HYPERTENSION: ICD-10-CM

## 2025-03-14 DIAGNOSIS — F32.A ANXIETY AND DEPRESSION: ICD-10-CM

## 2025-03-14 LAB
ALBUMIN SERPL BCP-MCNC: 4 G/DL (ref 3.5–5.2)
ALP SERPL-CCNC: 53 U/L (ref 40–150)
ALT SERPL W/O P-5'-P-CCNC: 16 U/L (ref 10–44)
ANION GAP SERPL CALC-SCNC: 8 MMOL/L (ref 8–16)
AST SERPL-CCNC: 34 U/L (ref 10–40)
BASOPHILS # BLD AUTO: 0.04 K/UL (ref 0–0.2)
BASOPHILS NFR BLD: 0.5 % (ref 0–1.9)
BILIRUB SERPL-MCNC: 0.2 MG/DL (ref 0.1–1)
BUN SERPL-MCNC: 19 MG/DL (ref 6–20)
CALCIUM SERPL-MCNC: 9.4 MG/DL (ref 8.7–10.5)
CHLORIDE SERPL-SCNC: 109 MMOL/L (ref 95–110)
CHOLEST SERPL-MCNC: 190 MG/DL (ref 120–199)
CHOLEST/HDLC SERPL: 3.7 {RATIO} (ref 2–5)
CO2 SERPL-SCNC: 23 MMOL/L (ref 23–29)
CREAT SERPL-MCNC: 1.1 MG/DL (ref 0.5–1.4)
DIFFERENTIAL METHOD BLD: NORMAL
EOSINOPHIL # BLD AUTO: 0.4 K/UL (ref 0–0.5)
EOSINOPHIL NFR BLD: 5.7 % (ref 0–8)
ERYTHROCYTE [DISTWIDTH] IN BLOOD BY AUTOMATED COUNT: 13.2 % (ref 11.5–14.5)
EST. GFR  (NO RACE VARIABLE): >60 ML/MIN/1.73 M^2
ESTIMATED AVG GLUCOSE: 100 MG/DL (ref 68–131)
GLUCOSE SERPL-MCNC: 86 MG/DL (ref 70–110)
HBA1C MFR BLD: 5.1 % (ref 4–5.6)
HCT VFR BLD AUTO: 39.6 % (ref 37–48.5)
HDLC SERPL-MCNC: 51 MG/DL (ref 40–75)
HDLC SERPL: 26.8 % (ref 20–50)
HGB BLD-MCNC: 13.1 G/DL (ref 12–16)
IMM GRANULOCYTES # BLD AUTO: 0.01 K/UL (ref 0–0.04)
IMM GRANULOCYTES NFR BLD AUTO: 0.1 % (ref 0–0.5)
LDLC SERPL CALC-MCNC: 126.4 MG/DL (ref 63–159)
LYMPHOCYTES # BLD AUTO: 3.2 K/UL (ref 1–4.8)
LYMPHOCYTES NFR BLD: 43.3 % (ref 18–48)
MCH RBC QN AUTO: 30.6 PG (ref 27–31)
MCHC RBC AUTO-ENTMCNC: 33.1 G/DL (ref 32–36)
MCV RBC AUTO: 93 FL (ref 82–98)
MONOCYTES # BLD AUTO: 0.5 K/UL (ref 0.3–1)
MONOCYTES NFR BLD: 6.9 % (ref 4–15)
NEUTROPHILS # BLD AUTO: 3.2 K/UL (ref 1.8–7.7)
NEUTROPHILS NFR BLD: 43.5 % (ref 38–73)
NONHDLC SERPL-MCNC: 139 MG/DL
NRBC BLD-RTO: 0 /100 WBC
PLATELET # BLD AUTO: 265 K/UL (ref 150–450)
PMV BLD AUTO: 10.4 FL (ref 9.2–12.9)
POTASSIUM SERPL-SCNC: 4.6 MMOL/L (ref 3.5–5.1)
PROT SERPL-MCNC: 7.3 G/DL (ref 6–8.4)
RBC # BLD AUTO: 4.28 M/UL (ref 4–5.4)
SODIUM SERPL-SCNC: 140 MMOL/L (ref 136–145)
TRIGL SERPL-MCNC: 63 MG/DL (ref 30–150)
TSH SERPL DL<=0.005 MIU/L-ACNC: 0.9 UIU/ML (ref 0.4–4)
WBC # BLD AUTO: 7.43 K/UL (ref 3.9–12.7)

## 2025-03-14 PROCEDURE — 85025 COMPLETE CBC W/AUTO DIFF WBC: CPT | Performed by: STUDENT IN AN ORGANIZED HEALTH CARE EDUCATION/TRAINING PROGRAM

## 2025-03-14 PROCEDURE — 99214 OFFICE O/P EST MOD 30 MIN: CPT | Mod: PBBFAC,PN | Performed by: STUDENT IN AN ORGANIZED HEALTH CARE EDUCATION/TRAINING PROGRAM

## 2025-03-14 PROCEDURE — 80061 LIPID PANEL: CPT | Performed by: STUDENT IN AN ORGANIZED HEALTH CARE EDUCATION/TRAINING PROGRAM

## 2025-03-14 PROCEDURE — 84443 ASSAY THYROID STIM HORMONE: CPT | Performed by: STUDENT IN AN ORGANIZED HEALTH CARE EDUCATION/TRAINING PROGRAM

## 2025-03-14 PROCEDURE — 83036 HEMOGLOBIN GLYCOSYLATED A1C: CPT | Performed by: STUDENT IN AN ORGANIZED HEALTH CARE EDUCATION/TRAINING PROGRAM

## 2025-03-14 PROCEDURE — 99999 PR PBB SHADOW E&M-EST. PATIENT-LVL IV: CPT | Mod: PBBFAC,,, | Performed by: STUDENT IN AN ORGANIZED HEALTH CARE EDUCATION/TRAINING PROGRAM

## 2025-03-14 PROCEDURE — 80053 COMPREHEN METABOLIC PANEL: CPT | Performed by: STUDENT IN AN ORGANIZED HEALTH CARE EDUCATION/TRAINING PROGRAM

## 2025-03-14 RX ORDER — HYDROCHLOROTHIAZIDE 12.5 MG/1
12.5 TABLET ORAL DAILY
Qty: 30 TABLET | Refills: 2 | Status: SHIPPED | OUTPATIENT
Start: 2025-03-14

## 2025-03-14 NOTE — PROGRESS NOTES
"  [facility]   Primary Care Visit    DATE   03/14/2025 9:29 AM    REASON FOR VISIT LISTED IN EPIC   Annual Exam (No concerns/complaints )    Last Primary Care Visit: Visit date not found    HISTORY OF PRESENTING ILLNESS   Alexis Milligan, 29 y.o., presents today for annual exam. She has no past medical history on file.    Today, the patient reports feeling well overall.     She states that for an approximately 4-6 month period this past year she was experiencing high levels of stress due to financial concerns and the fact she is working three jobs to support herself. She states as a result, she was eating poorly, and was not exercising as she once did. She has been out of her HCTZ for approximately 1 year. She measures her BP at home regularly with typical readings being around 150/90. A few times per month, she endorses experiencing headaches and seeing "dark spots." These episodes prompt her to check her BP, and during them, it has been as high as 170 systolic.    Within the past month, she has restarted exercising by lifting weights and doing cardio. She has also begun to eat healthier once again, incorporating more greens, eating salad almost daily, and increasing her lean protein intake as opposed to eating foods like poboys and crawfish as she was doing before. She is aware that she needs to cut down on her sodium intake as well.       Current Medications:  Current Outpatient Medications   Medication Sig    fluconazole (DIFLUCAN) 150 MG Tab Take 1 tablet (150 mg total) by mouth once daily.    hydroCHLOROthiazide 12.5 MG Tab Take 1 tablet (12.5 mg total) by mouth once daily.    metroNIDAZOLE (METROGEL) 0.75 % (37.5mg/5 gram) vaginal gel Place one applicatorful once daily at bedtime for 5 days   Last reviewed on 3/14/2025  9:08 AM by Herbert Natarajan MA    Allergies:  Review of patient's allergies indicates:  No Known Allergies    SUBJECTIVE   Review of Systems:  Review of Systems " "  Constitutional:  Positive for fatigue (chronic). Negative for chills and fever.   HENT:  Negative for ear pain, hearing loss and tinnitus.    Eyes:  Negative for visual disturbance.   Respiratory:  Negative for cough, shortness of breath and wheezing.    Cardiovascular:  Negative for chest pain and palpitations.   Gastrointestinal:  Negative for abdominal pain, constipation, diarrhea, nausea and vomiting.   Endocrine: Negative for polyuria.   Genitourinary:  Negative for dysuria, frequency and urgency.   Musculoskeletal:  Positive for arthralgias (chronic rotator cuff aches). Negative for myalgias.   Skin:  Negative for rash.   Neurological:  Positive for headaches. Negative for dizziness and light-headedness.   Psychiatric/Behavioral:  Positive for dysphoric mood (Improving recently). Negative for sleep disturbance. The patient is nervous/anxious.         States sometimes she feels "If something happened to me I wouldn't be mad" but strongly denies active SI or HI, denies any plans. Reports that these feelings were worse months ago but are now improving and that she has more energy to engage in activities other than going to work, such as returning to the gym         OBJECTIVE   Vital Signs:  Vitals:    03/14/25 0909 03/14/25 0911   BP: (!) 167/109 (!) 171/99   BP Location: Left arm Left arm   Patient Position: Sitting Sitting   Pulse: (!) 51    Temp: 98.2 °F (36.8 °C)    TempSrc: Oral    SpO2: 99%    Weight: 69.6 kg (153 lb 7 oz)    Height: 5' 1.5" (1.562 m)     Body mass index is 28.52 kg/m².    Previous Weight:  Wt Readings from Last 3 Encounters:   03/14/25 69.6 kg (153 lb 7 oz)   03/03/23 69.5 kg (153 lb 3.5 oz)   02/27/23 70.5 kg (155 lb 8.6 oz)        Physical Exam:  Physical Exam  Constitutional:       General: She is not in acute distress.     Appearance: She is not ill-appearing.   HENT:      Head: Normocephalic and atraumatic.   Eyes:      General: No scleral icterus.        Right eye: No discharge.    "      Left eye: No discharge.   Cardiovascular:      Rate and Rhythm: Normal rate.      Pulses: Normal pulses.      Heart sounds: Normal heart sounds. No murmur heard.     No friction rub. No gallop.   Pulmonary:      Effort: Pulmonary effort is normal. No respiratory distress.      Breath sounds: Normal breath sounds. No wheezing or rales.   Abdominal:      General: Abdomen is flat. Bowel sounds are normal. There is no distension.      Palpations: Abdomen is soft.      Tenderness: There is no abdominal tenderness. There is no guarding.   Skin:     General: Skin is warm and dry.   Neurological:      General: No focal deficit present.      Mental Status: She is alert.   Psychiatric:         Mood and Affect: Mood normal.         Behavior: Behavior normal.         Thought Content: Thought content normal.         Judgment: Judgment normal.         Laboratory Results:  Lab Results   Component Value Date/Time    HGB 12.3 08/27/2021 11:59 AM    HCT 36.4 (L) 08/27/2021 11:59 AM    WBC 5.07 08/27/2021 11:59 AM     08/27/2021 11:59 AM    GLU 84 08/27/2021 11:59 AM    TSH 0.425 01/25/2023 10:22 AM    CHOL 186 08/27/2021 11:59 AM    HDL 55 08/27/2021 11:59 AM    TRIG 75 08/27/2021 11:59 AM    ALT 9 (L) 08/27/2021 11:59 AM    AST 19 08/27/2021 11:59 AM    K 4.0 08/27/2021 11:59 AM     08/27/2021 11:59 AM     08/27/2021 11:59 AM    BUN 17 08/27/2021 11:59 AM        PAST MEDICAL HISTORY and FAMILY HISTORY   Past medical, surgical, and family history: Reviewed and updated in EMR.     She has no past medical history on file. She has no past surgical history on file. Her family history is not on file.    SOCIAL HISTORY   Reviewed and updated social history in EMR.    She reports that she has never smoked. She has never used smokeless tobacco. She reports current alcohol use. She reports current drug use. Drug: Marijuana.     HEALTH MAINTENANCE PLANNING     Health Maintenance         Date Due Completion Date     Influenza Vaccine (1) Never done ---    COVID-19 Vaccine (3 - 2024-25 season) 09/01/2024 6/16/2021    Pap Smear 03/03/2026 3/3/2023    TETANUS VACCINE 10/28/2034 10/28/2024    RSV Vaccine (Age 60+ and Pregnant patients) (1 - 1-dose 75+ series) 06/15/2072 ---            Diabetes Screening:  Hemoglobin A1C   Date Value Ref Range Status   08/27/2021 5.0 4.0 - 5.6 % Final     Comment:     ADA Screening Guidelines:  5.7-6.4%  Consistent with prediabetes  >or=6.5%  Consistent with diabetes    High levels of fetal hemoglobin interfere with the HbA1C  assay. Heterozygous hemoglobin variants (HbS, HgC, etc)do  not significantly interfere with this assay.   However, presence of multiple variants may affect accuracy.          Cardiac Risk Screening:  The ASCVD Risk score (Doc DK, et al., 2019) failed to calculate for the following reasons:    The 2019 ASCVD risk score is only valid for ages 40 to 79    ASSESSMENT and PLAN     Alexis Milligan should follow up in 2 weeks for a blood pressure check, or at least annually for a wellness visit.  Uncontrolled hypertension  -     CBC Auto Differential; Future; Expected date: 03/14/2025  -     Comprehensive Metabolic Panel; Future; Expected date: 03/14/2025  -     Hemoglobin A1C; Future; Expected date: 03/14/2025  -     TSH; Future; Expected date: 03/14/2025  -     Lipid Panel; Future; Expected date: 03/14/2025    Hypertension, unspecified type  -     Restart hydroCHLOROthiazide 12.5 MG Tab; Take 1 tablet (12.5 mg total) by mouth once daily.  Dispense: 30 tablet; Refill: 2  - BP check in 2 weeks  - Advised patient to decrease sodium intake, ideally to 2g or less per day   - Counseled patient to continue making dietary adjustments and to keep engaging in regular exercise regimen    Bradycardia  -     TSH; Future; Expected date: 03/14/2025    Anxiety and depression        - Discussed referral to [facility] for behavioral health visit, patient agrees this would be helpful  -     Ambulatory  referral/consult to Primary Care Behavioral Health (Non-Opioids); Future; Expected date: 03/14/2025         Health Maintenance Discussed  - Patient advised of health maintenance recommendations for age, sex, and personal/social risk factors.   - Patient advised of benefits of receiving a wellness exam with health maintenance lab work and recommended vaccines annually.    Recommended Immunizations Discussed   - Patient advised of the benefits and risks of receiving recommended immunizations for health maintenance.    - Patient advised of the most common side effects of immunizations, including injection site soreness, injection site redness, injection site pain, and tiredness/fatigue for about 48-72 hours.   - Patient advised that severe side effects are very rare, including severe allergic reactions, wheezing, difficulties in breathing or shortness of breath.   - For in clinic administration of vaccine, patient advised that it is recommended that the patient stays in the waiting room for observation for at least 15 minutes to monitor for any reaction(s) to the vaccine.    Patient was counseled today on:  - Diet:  Patient has been advised to follow a diet emphasizing vegetables, fruits, whole grains, low-fat dairy products, fish, legumes and nuts. Patient has been advised to limit sodium, sweets and red meat. Benefit of 15-20 minute post-prandial walk/stroll was discussed.  - Exercise: Patient has been advised that regular physical activity positively impacts health. Patients who are able should engage in mild-to-moderate-intensity aerobic for a minimum of 20-30 minutes 4-5 times a week.    - Smoking and Alcohol:  Harmful effects of smoking, etOH, and recreational drugs discussed. Patient has been advised that counseling with or without drug therapy is available to help quit smoking.  - Sleep Apnea: Patient has been advised that a referral for HUSSEIN testing is available, and that treatment with positive airway pressure  may be beneficial if she is found to have HUSSEIN.    The above asssessment and plan was discussed with Dr. Ordoñez.    --  Huseyin Pedro   Medical Student    I hereby acknowledge that I am relying upon documentation authored by a medical student working under my supervision and further I hereby attest that I have verified the student documentation or findings by personally performing the physical exam and medical decision making activities of the Evaluation and Management service to be billed.    Windy Ordoñez MD

## 2025-03-17 ENCOUNTER — RESULTS FOLLOW-UP (OUTPATIENT)
Dept: PRIMARY CARE CLINIC | Facility: CLINIC | Age: 30
End: 2025-03-17

## 2025-03-28 ENCOUNTER — CLINICAL SUPPORT (OUTPATIENT)
Dept: PRIMARY CARE CLINIC | Facility: CLINIC | Age: 30
End: 2025-03-28

## 2025-03-28 VITALS — OXYGEN SATURATION: 99 % | DIASTOLIC BLOOD PRESSURE: 98 MMHG | HEART RATE: 60 BPM | SYSTOLIC BLOOD PRESSURE: 145 MMHG

## 2025-03-28 DIAGNOSIS — Z01.30 BLOOD PRESSURE CHECK: Primary | ICD-10-CM

## 2025-03-28 PROCEDURE — 99999 PR PBB SHADOW E&M-EST. PATIENT-LVL II: CPT | Mod: PBBFAC,,,

## 2025-03-28 PROCEDURE — 99212 OFFICE O/P EST SF 10 MIN: CPT | Mod: PBBFAC,PN

## 2025-03-28 NOTE — Clinical Note
Pt came in for a nurse visit for a blood pressure check. Pt is scheduled for next week for another nurse visit due to elevated b/p.

## 2025-03-28 NOTE — PROGRESS NOTES
Alexis Milligan 29 y.o. female is here today for Blood Pressure check.   History of HTN yes.    Review of patient's allergies indicates:  No Known Allergies  Creatinine   Date Value Ref Range Status   03/14/2025 1.1 0.5 - 1.4 mg/dL Final     Sodium   Date Value Ref Range Status   03/14/2025 140 136 - 145 mmol/L Final     Potassium   Date Value Ref Range Status   03/14/2025 4.6 3.5 - 5.1 mmol/L Final   ]  Patient denies taking blood pressure medications on a regular basis at the same time of the day.   Current Medications[1]  Does patient have record of home blood pressure readings yes. Readings have been averaging some readings are high/low.   Last dose of blood pressure medication was taken at yesterday. Pt forgot to take her medication this morning.  Patient is asymptomatic.   Complains of nothing. Pt has to admitted to eating boiled seafood.     Vitals:    03/28/25 0849 03/28/25 0851   BP: (!) 141/98 (!) 145/98   Pulse: 64 60   SpO2: 99%          Dr. Ordoñez  informed of nurse visit.          [1]   Current Outpatient Medications:     fluconazole (DIFLUCAN) 150 MG Tab, Take 1 tablet (150 mg total) by mouth once daily., Disp: 1 tablet, Rfl: 0    hydroCHLOROthiazide 12.5 MG Tab, Take 1 tablet (12.5 mg total) by mouth once daily., Disp: 30 tablet, Rfl: 2    metroNIDAZOLE (METROGEL) 0.75 % (37.5mg/5 gram) vaginal gel, Place one applicatorful once daily at bedtime for 5 days, Disp: 70 g, Rfl: 0

## 2025-04-07 ENCOUNTER — PATIENT MESSAGE (OUTPATIENT)
Dept: ADMINISTRATIVE | Facility: HOSPITAL | Age: 30
End: 2025-04-07

## 2025-05-05 ENCOUNTER — PATIENT MESSAGE (OUTPATIENT)
Dept: ADMINISTRATIVE | Facility: HOSPITAL | Age: 30
End: 2025-05-05

## 2025-06-02 ENCOUNTER — PATIENT MESSAGE (OUTPATIENT)
Dept: ADMINISTRATIVE | Facility: HOSPITAL | Age: 30
End: 2025-06-02

## 2025-06-20 DIAGNOSIS — I10 HYPERTENSION, UNSPECIFIED TYPE: ICD-10-CM

## 2025-06-20 NOTE — TELEPHONE ENCOUNTER
No care due was identified.  Health [facility] Embedded Care Due Messages. Reference number: [id number].   6/20/2025 8:12:51 AM CDT

## 2025-06-22 NOTE — TELEPHONE ENCOUNTER
Refill Routing Note   Medication(s) are not appropriate for processing by [facility] for the following reason(s):        Required vitals abnormal    [facility] action(s):  Defer             Appointments  past 12m or future 3m with PCP    Date Provider   Last Visit   3/14/2025 Windy Ordoñez MD   Next Visit   6/27/2025 Windy Ordoñez MD   ED visits in past 90 days: 0        Note composed:10:33 AM 06/22/2025

## 2025-06-23 RX ORDER — HYDROCHLOROTHIAZIDE 12.5 MG/1
TABLET ORAL
Qty: 90 TABLET | Refills: 2 | Status: SHIPPED | OUTPATIENT
Start: 2025-06-23

## 2025-06-27 ENCOUNTER — OFFICE VISIT (OUTPATIENT)
Dept: PRIMARY CARE CLINIC | Facility: CLINIC | Age: 30
End: 2025-06-27

## 2025-06-27 VITALS
TEMPERATURE: 98 F | HEIGHT: 61 IN | HEART RATE: 54 BPM | DIASTOLIC BLOOD PRESSURE: 99 MMHG | OXYGEN SATURATION: 100 % | SYSTOLIC BLOOD PRESSURE: 155 MMHG | BODY MASS INDEX: 29.68 KG/M2 | WEIGHT: 157.19 LBS

## 2025-06-27 DIAGNOSIS — I10 UNCONTROLLED HYPERTENSION: Primary | ICD-10-CM

## 2025-06-27 PROCEDURE — 99999 PR PBB SHADOW E&M-EST. PATIENT-LVL III: CPT | Mod: PBBFAC,,, | Performed by: STUDENT IN AN ORGANIZED HEALTH CARE EDUCATION/TRAINING PROGRAM

## 2025-06-27 PROCEDURE — 99213 OFFICE O/P EST LOW 20 MIN: CPT | Mod: PBBFAC,PN | Performed by: STUDENT IN AN ORGANIZED HEALTH CARE EDUCATION/TRAINING PROGRAM

## 2025-06-27 RX ORDER — AMLODIPINE BESYLATE 10 MG/1
10 TABLET ORAL DAILY
Qty: 30 TABLET | Refills: 11 | Status: SHIPPED | OUTPATIENT
Start: 2025-06-27 | End: 2026-06-27

## 2025-06-27 NOTE — PROGRESS NOTES
06/27/2025    Alexis Milligan  [id number]    Chief Complaint   Patient presents with    Hypertension     3 mo F/U        HPI    History of Present Illness    CHIEF COMPLAINT:  Nabeel presents today for follow-up of high blood pressure    HYPERTENSION:  She reports variable home blood pressure readings, typically around 135/80 with occasional elevations to 180/100-175/109. She checks her blood pressure several times weekly at home and notes that readings tend to rise when she stops monitoring regularly. Her medication compliance is consistent Sunday through Thursday before work but becomes inconsistent on weekends. She takes her medication at least 4 times per week and took it today before the appointment. She demonstrates awareness of potential complications including stroke, heart attack, and dizziness.    GI CONCERNS:  She reports ongoing constipation that has been refractory to multiple OTC interventions including magnesium and Milk of Magnesia. She increased water and tea intake for symptom management. A trial of laxative tea provided minimal relief but caused severe abdominal cramping, leading to discontinuation.    DIET AND EXERCISE:  She previously maintained a consistent exercise routine of 4-5 gym sessions weekly, each lasting approximately 2 hours, but took an 8-month hiatus due to financial constraints and depression. She resumed exercising yesterday and expresses motivation to re-establish her previous fitness routine. Her diet has become less intentional since discontinuing gym membership, primarily eating out for convenience after work rather than preparing home-cooked meals.    CONTRACEPTION:  She has never previously obtained birth control due to lack of health insurance. She is uncertain about future plans for having children but expresses interest in exploring contraceptive options once she secures insurance coverage.      ROS:  General: -fever, -chills, -fatigue, -weight gain, -weight loss  Eyes:  -vision changes, -redness, -discharge  ENT: -ear pain, -nasal congestion, -sore throat  Cardiovascular: -chest pain, -palpitations, -lower extremity edema  Respiratory: -cough, -shortness of breath  Gastrointestinal: -abdominal pain, -nausea, -vomiting, -diarrhea, +constipation, -blood in stool  Genitourinary: -dysuria, -hematuria, -frequency  Musculoskeletal: -joint pain, -muscle pain  Skin: -rash, -lesion  Neurological: -headache, -dizziness, -numbness, -tingling  Psychiatric: -anxiety, +depression, -sleep difficulty             Negative 10 point ROS outside of HPI    Social History     Socioeconomic History    Marital status: Single   Tobacco Use    Smoking status: Never     Passive exposure: Current    Smokeless tobacco: Never   Substance and Sexual Activity    Alcohol use: Yes    Drug use: Yes     Types: Marijuana    Sexual activity: Yes     Partners: Male     Birth control/protection: Condom         Current Medications[1]      Physical Exam  Vitals:    06/27/25 1000   BP: (!) 155/99   Pulse:    Temp:        Physical Exam      Gen: well appearing, NAD  Resp: non labored breathing, no crackles, no wheezes, CTAB      Problem List Items Addressed This Visit    None  Visit Diagnoses         Uncontrolled hypertension    -  Primary            1. Uncontrolled hypertension  -START amLODIPine (NORVASC) 10 MG tablet; Take 1 tablet (10 mg total) by mouth once daily.  Dispense: 30 tablet; Refill: 11  -CONTINUE HCTZ 12.5 mg  -discussed lifestyle modifications and importance of medication adherence      RTC in 2 weeks nurse visit and 3 month med check     Windy Ordoñez MD  Family Medicine           [1]   Current Outpatient Medications:     hydroCHLOROthiazide 12.5 MG Tab, TAKE 1 TABLET(12.5 MG) BY MOUTH DAILY, Disp: 90 tablet, Rfl: 2    amLODIPine (NORVASC) 10 MG tablet, Take 1 tablet (10 mg total) by mouth once daily., Disp: 30 tablet, Rfl: 11

## 2025-07-07 ENCOUNTER — PATIENT MESSAGE (OUTPATIENT)
Dept: ADMINISTRATIVE | Facility: HOSPITAL | Age: 30
End: 2025-07-07